# Patient Record
Sex: MALE | Race: WHITE | NOT HISPANIC OR LATINO
[De-identification: names, ages, dates, MRNs, and addresses within clinical notes are randomized per-mention and may not be internally consistent; named-entity substitution may affect disease eponyms.]

---

## 2020-11-09 ENCOUNTER — TRANSCRIPTION ENCOUNTER (OUTPATIENT)
Age: 55
End: 2020-11-09

## 2020-12-17 ENCOUNTER — APPOINTMENT (OUTPATIENT)
Dept: UROLOGY | Facility: CLINIC | Age: 55
End: 2020-12-17

## 2021-01-07 ENCOUNTER — APPOINTMENT (OUTPATIENT)
Dept: UROLOGY | Facility: CLINIC | Age: 56
End: 2021-01-07

## 2021-03-18 ENCOUNTER — APPOINTMENT (OUTPATIENT)
Age: 56
End: 2021-03-18

## 2025-06-08 ENCOUNTER — INPATIENT (INPATIENT)
Facility: HOSPITAL | Age: 60
LOS: 2 days | Discharge: HOME | DRG: 204 | End: 2025-06-11
Attending: STUDENT IN AN ORGANIZED HEALTH CARE EDUCATION/TRAINING PROGRAM | Admitting: STUDENT IN AN ORGANIZED HEALTH CARE EDUCATION/TRAINING PROGRAM
Payer: COMMERCIAL

## 2025-06-08 VITALS
HEART RATE: 88 BPM | DIASTOLIC BLOOD PRESSURE: 88 MMHG | SYSTOLIC BLOOD PRESSURE: 151 MMHG | OXYGEN SATURATION: 97 % | HEIGHT: 69 IN | RESPIRATION RATE: 20 BRPM | TEMPERATURE: 98 F | WEIGHT: 180.56 LBS

## 2025-06-08 LAB
ALBUMIN SERPL ELPH-MCNC: 4.3 G/DL — SIGNIFICANT CHANGE UP (ref 3.3–5.2)
ALP SERPL-CCNC: 94 U/L — SIGNIFICANT CHANGE UP (ref 40–120)
ALT FLD-CCNC: 12 U/L — SIGNIFICANT CHANGE UP
ANION GAP SERPL CALC-SCNC: 18 MMOL/L — HIGH (ref 5–17)
APTT BLD: 28.5 SEC — SIGNIFICANT CHANGE UP (ref 26.1–36.8)
AST SERPL-CCNC: 16 U/L — SIGNIFICANT CHANGE UP
BASOPHILS # BLD AUTO: 0.06 K/UL — SIGNIFICANT CHANGE UP (ref 0–0.2)
BASOPHILS NFR BLD AUTO: 0.5 % — SIGNIFICANT CHANGE UP (ref 0–2)
BILIRUB SERPL-MCNC: 0.7 MG/DL — SIGNIFICANT CHANGE UP (ref 0.4–2)
BUN SERPL-MCNC: 22.1 MG/DL — HIGH (ref 8–20)
CALCIUM SERPL-MCNC: 9.4 MG/DL — SIGNIFICANT CHANGE UP (ref 8.4–10.5)
CHLORIDE SERPL-SCNC: 94 MMOL/L — LOW (ref 96–108)
CK MB CFR SERPL CALC: 3.3 NG/ML — SIGNIFICANT CHANGE UP (ref 0–6.7)
CK SERPL-CCNC: 169 U/L — SIGNIFICANT CHANGE UP (ref 30–200)
CO2 SERPL-SCNC: 22 MMOL/L — SIGNIFICANT CHANGE UP (ref 22–29)
CREAT SERPL-MCNC: 1.14 MG/DL — SIGNIFICANT CHANGE UP (ref 0.5–1.3)
D DIMER BLD IA.RAPID-MCNC: 174 NG/ML DDU — SIGNIFICANT CHANGE UP
EGFR: 74 ML/MIN/1.73M2 — SIGNIFICANT CHANGE UP
EGFR: 74 ML/MIN/1.73M2 — SIGNIFICANT CHANGE UP
EOSINOPHIL # BLD AUTO: 0.12 K/UL — SIGNIFICANT CHANGE UP (ref 0–0.5)
EOSINOPHIL NFR BLD AUTO: 1.1 % — SIGNIFICANT CHANGE UP (ref 0–6)
GLUCOSE SERPL-MCNC: 191 MG/DL — HIGH (ref 70–99)
HCT VFR BLD CALC: 41.6 % — SIGNIFICANT CHANGE UP (ref 39–50)
HGB BLD-MCNC: 14.3 G/DL — SIGNIFICANT CHANGE UP (ref 13–17)
IMM GRANULOCYTES # BLD AUTO: 0.03 K/UL — SIGNIFICANT CHANGE UP (ref 0–0.07)
IMM GRANULOCYTES NFR BLD AUTO: 0.3 % — SIGNIFICANT CHANGE UP (ref 0–0.9)
INR BLD: 0.91 RATIO — SIGNIFICANT CHANGE UP (ref 0.85–1.16)
LYMPHOCYTES # BLD AUTO: 2.18 K/UL — SIGNIFICANT CHANGE UP (ref 1–3.3)
LYMPHOCYTES NFR BLD AUTO: 19.2 % — SIGNIFICANT CHANGE UP (ref 13–44)
MCHC RBC-ENTMCNC: 28.5 PG — SIGNIFICANT CHANGE UP (ref 27–34)
MCHC RBC-ENTMCNC: 34.4 G/DL — SIGNIFICANT CHANGE UP (ref 32–36)
MCV RBC AUTO: 83 FL — SIGNIFICANT CHANGE UP (ref 80–100)
MONOCYTES # BLD AUTO: 0.71 K/UL — SIGNIFICANT CHANGE UP (ref 0–0.9)
MONOCYTES NFR BLD AUTO: 6.2 % — SIGNIFICANT CHANGE UP (ref 2–14)
NEUTROPHILS # BLD AUTO: 8.27 K/UL — HIGH (ref 1.8–7.4)
NEUTROPHILS NFR BLD AUTO: 72.7 % — SIGNIFICANT CHANGE UP (ref 43–77)
NRBC # BLD AUTO: 0 K/UL — SIGNIFICANT CHANGE UP (ref 0–0)
NRBC # FLD: 0 K/UL — SIGNIFICANT CHANGE UP (ref 0–0)
NRBC BLD AUTO-RTO: 0 /100 WBCS — SIGNIFICANT CHANGE UP (ref 0–0)
PLATELET # BLD AUTO: 233 K/UL — SIGNIFICANT CHANGE UP (ref 150–400)
PMV BLD: 9.7 FL — SIGNIFICANT CHANGE UP (ref 7–13)
POTASSIUM SERPL-MCNC: 4.2 MMOL/L — SIGNIFICANT CHANGE UP (ref 3.5–5.3)
POTASSIUM SERPL-SCNC: 4.2 MMOL/L — SIGNIFICANT CHANGE UP (ref 3.5–5.3)
PROT SERPL-MCNC: 7.4 G/DL — SIGNIFICANT CHANGE UP (ref 6.6–8.7)
PROTHROM AB SERPL-ACNC: 10.3 SEC — SIGNIFICANT CHANGE UP (ref 9.9–13.4)
RBC # BLD: 5.01 M/UL — SIGNIFICANT CHANGE UP (ref 4.2–5.8)
RBC # FLD: 12.7 % — SIGNIFICANT CHANGE UP (ref 10.3–14.5)
SODIUM SERPL-SCNC: 134 MMOL/L — LOW (ref 135–145)
TROPONIN T, HIGH SENSITIVITY RESULT: 10 NG/L — SIGNIFICANT CHANGE UP (ref 0–51)
TROPONIN T, HIGH SENSITIVITY RESULT: 12 NG/L — SIGNIFICANT CHANGE UP (ref 0–51)
WBC # BLD: 11.37 K/UL — HIGH (ref 3.8–10.5)
WBC # FLD AUTO: 11.37 K/UL — HIGH (ref 3.8–10.5)

## 2025-06-08 PROCEDURE — 71045 X-RAY EXAM CHEST 1 VIEW: CPT | Mod: 26

## 2025-06-08 PROCEDURE — 99223 1ST HOSP IP/OBS HIGH 75: CPT

## 2025-06-08 RX ORDER — SODIUM CHLORIDE 9 G/1000ML
1000 INJECTION, SOLUTION INTRAVENOUS
Refills: 0 | Status: DISCONTINUED | OUTPATIENT
Start: 2025-06-08 | End: 2025-06-11

## 2025-06-08 RX ORDER — DEXTROSE 50 % IN WATER 50 %
25 SYRINGE (ML) INTRAVENOUS ONCE
Refills: 0 | Status: DISCONTINUED | OUTPATIENT
Start: 2025-06-08 | End: 2025-06-11

## 2025-06-08 RX ORDER — AMLODIPINE BESYLATE 10 MG/1
5 TABLET ORAL DAILY
Refills: 0 | Status: DISCONTINUED | OUTPATIENT
Start: 2025-06-08 | End: 2025-06-11

## 2025-06-08 RX ORDER — INSULIN LISPRO 100 U/ML
INJECTION, SOLUTION INTRAVENOUS; SUBCUTANEOUS
Refills: 0 | Status: DISCONTINUED | OUTPATIENT
Start: 2025-06-08 | End: 2025-06-11

## 2025-06-08 RX ORDER — ROSUVASTATIN CALCIUM 20 MG/1
10 TABLET, FILM COATED ORAL AT BEDTIME
Refills: 0 | Status: DISCONTINUED | OUTPATIENT
Start: 2025-06-08 | End: 2025-06-09

## 2025-06-08 RX ORDER — DEXTROSE 50 % IN WATER 50 %
12.5 SYRINGE (ML) INTRAVENOUS ONCE
Refills: 0 | Status: DISCONTINUED | OUTPATIENT
Start: 2025-06-08 | End: 2025-06-11

## 2025-06-08 RX ORDER — GLUCAGON 3 MG/1
1 POWDER NASAL ONCE
Refills: 0 | Status: DISCONTINUED | OUTPATIENT
Start: 2025-06-08 | End: 2025-06-11

## 2025-06-08 RX ORDER — DEXTROSE 50 % IN WATER 50 %
15 SYRINGE (ML) INTRAVENOUS ONCE
Refills: 0 | Status: DISCONTINUED | OUTPATIENT
Start: 2025-06-08 | End: 2025-06-11

## 2025-06-08 RX ORDER — LOSARTAN POTASSIUM 100 MG/1
100 TABLET, FILM COATED ORAL DAILY
Refills: 0 | Status: DISCONTINUED | OUTPATIENT
Start: 2025-06-08 | End: 2025-06-11

## 2025-06-08 RX ORDER — LINAGLIPTIN 5 MG/1
5 TABLET, FILM COATED ORAL DAILY
Refills: 0 | Status: DISCONTINUED | OUTPATIENT
Start: 2025-06-08 | End: 2025-06-09

## 2025-06-08 RX ORDER — METFORMIN HYDROCHLORIDE 850 MG/1
1000 TABLET ORAL DAILY
Refills: 0 | Status: DISCONTINUED | OUTPATIENT
Start: 2025-06-08 | End: 2025-06-09

## 2025-06-08 RX ADMIN — Medication 1000 MILLILITER(S): at 23:27

## 2025-06-08 NOTE — ED PROVIDER NOTE - CHIEF COMPLAINT
The skin of the right groin and right wrist was clipped, prepped and draped in the usual sterile manner. (If not otherwise specified, skin prep was bilateral.) The patient is a 60y Male complaining of chest pain.

## 2025-06-08 NOTE — ED ADULT TRIAGE NOTE - CHIEF COMPLAINT QUOTE
Patient presents to ED sent by Urgent Care for intermittant chest pain that started this morning. Patient denies dizziness at this time, denies being diaphoretic. Denies N/V

## 2025-06-08 NOTE — ED CDU PROVIDER INITIAL DAY NOTE - ATTENDING APP SHARED VISIT CONTRIBUTION OF CARE
I, Miguel Angel Wilson, performed a face to face bedside interview with this patient regarding history of present illness, and completed an independent physical examination. I personally made/approved the management plan and take responsibility for the patient management. I have communicated the patient’s plan of care and disposition with the ACP.  60 year old placed on obs for chest pain, trop neg, currently sx free, pending cardio consult  Gen: NAD, well appearing  CV: RRR  Pul: CTA b/l  Abd: Soft, non-distended, non-tender  Neuro: no focal deficits

## 2025-06-08 NOTE — ED CDU PROVIDER INITIAL DAY NOTE - CLINICAL SUMMARY MEDICAL DECISION MAKING FREE TEXT BOX
60 year old male with hx htn, hld, dm presented to ED c/o left chest pain. Cardiology consult, tele, trend trops

## 2025-06-08 NOTE — ED CDU PROVIDER INITIAL DAY NOTE - PROGRESS NOTE DETAILS
patient with chest pain, dizziness and diaphoresis while power washing, has risk factors for CAD, never underwent stent placement, seen by cardiology pending coronary CTA and echo.  Will continue to follow.

## 2025-06-08 NOTE — ED PROVIDER NOTE - OBJECTIVE STATEMENT
60 year old male with PMH HTN presents with palpitations and SOB. Pt reports that his Sx started this morning when he was power washing his boat. He states that he broke out into a sweat, his heart was pounding, he had a pressure in his chest and felt very SOB. Sx improved with rest. He reports that throughout the day he has had exertional dyspnea, palpitations, and lightheadedness. The chest discomfort only occurred with the first episode. Sx resolve with rest and he is currently asymptomatic.

## 2025-06-08 NOTE — ED ADULT NURSE NOTE - OBJECTIVE STATEMENT
pt comes in with c/o chest pain, pt states that he has had this pain before but no cardiac history, pt is axox4 no other complaints at this time

## 2025-06-08 NOTE — ED PROVIDER NOTE - CLINICAL SUMMARY MEDICAL DECISION MAKING FREE TEXT BOX
pt currently symptom free, but placed on obs for intermediate exertional dyspnea and chest discomfort

## 2025-06-08 NOTE — ED CDU PROVIDER INITIAL DAY NOTE - OBJECTIVE STATEMENT
60 year old male with hx htn, hld, dm presented to ED c/o left chest pain. Today occurred with exertion while he was power washing, felt racing heart sensation, sob. states the same feeling occurred 1 week ago while laying in bed at rest prompting him to report to PCP where he had an ekg performed. has not followed with a cardiologist. admits to drinking 1 1/2 glasses of wine tonight. denies drug use, non smoker. denies fever/chills, n/v, abd pain, calf pain, leg edema

## 2025-06-09 ENCOUNTER — RESULT REVIEW (OUTPATIENT)
Age: 60
End: 2025-06-09

## 2025-06-09 DIAGNOSIS — I10 ESSENTIAL (PRIMARY) HYPERTENSION: ICD-10-CM

## 2025-06-09 DIAGNOSIS — R06.09 OTHER FORMS OF DYSPNEA: ICD-10-CM

## 2025-06-09 DIAGNOSIS — R07.9 CHEST PAIN, UNSPECIFIED: ICD-10-CM

## 2025-06-09 LAB
ALBUMIN SERPL ELPH-MCNC: 3.9 G/DL — SIGNIFICANT CHANGE UP (ref 3.3–5.2)
ALP SERPL-CCNC: 88 U/L — SIGNIFICANT CHANGE UP (ref 40–120)
ALT FLD-CCNC: 11 U/L — SIGNIFICANT CHANGE UP
ANION GAP SERPL CALC-SCNC: 12 MMOL/L — SIGNIFICANT CHANGE UP (ref 5–17)
AST SERPL-CCNC: 13 U/L — SIGNIFICANT CHANGE UP
BILIRUB SERPL-MCNC: 0.8 MG/DL — SIGNIFICANT CHANGE UP (ref 0.4–2)
BUN SERPL-MCNC: 17.1 MG/DL — SIGNIFICANT CHANGE UP (ref 8–20)
CALCIUM SERPL-MCNC: 8.6 MG/DL — SIGNIFICANT CHANGE UP (ref 8.4–10.5)
CHLORIDE SERPL-SCNC: 101 MMOL/L — SIGNIFICANT CHANGE UP (ref 96–108)
CO2 SERPL-SCNC: 22 MMOL/L — SIGNIFICANT CHANGE UP (ref 22–29)
CREAT SERPL-MCNC: 0.86 MG/DL — SIGNIFICANT CHANGE UP (ref 0.5–1.3)
EGFR: 100 ML/MIN/1.73M2 — SIGNIFICANT CHANGE UP
EGFR: 100 ML/MIN/1.73M2 — SIGNIFICANT CHANGE UP
GLUCOSE BLDC GLUCOMTR-MCNC: 126 MG/DL — HIGH (ref 70–99)
GLUCOSE BLDC GLUCOMTR-MCNC: 139 MG/DL — HIGH (ref 70–99)
GLUCOSE BLDC GLUCOMTR-MCNC: 241 MG/DL — HIGH (ref 70–99)
GLUCOSE SERPL-MCNC: 140 MG/DL — HIGH (ref 70–99)
POTASSIUM SERPL-MCNC: 3.9 MMOL/L — SIGNIFICANT CHANGE UP (ref 3.5–5.3)
POTASSIUM SERPL-SCNC: 3.9 MMOL/L — SIGNIFICANT CHANGE UP (ref 3.5–5.3)
PROT SERPL-MCNC: 6.8 G/DL — SIGNIFICANT CHANGE UP (ref 6.6–8.7)
SODIUM SERPL-SCNC: 135 MMOL/L — SIGNIFICANT CHANGE UP (ref 135–145)

## 2025-06-09 PROCEDURE — 93306 TTE W/DOPPLER COMPLETE: CPT | Mod: 26

## 2025-06-09 PROCEDURE — 99221 1ST HOSP IP/OBS SF/LOW 40: CPT

## 2025-06-09 PROCEDURE — 99223 1ST HOSP IP/OBS HIGH 75: CPT

## 2025-06-09 PROCEDURE — 99233 SBSQ HOSP IP/OBS HIGH 50: CPT

## 2025-06-09 PROCEDURE — 75574 CT ANGIO HRT W/3D IMAGE: CPT | Mod: 26

## 2025-06-09 RX ORDER — METOPROLOL SUCCINATE 50 MG/1
25 TABLET, EXTENDED RELEASE ORAL ONCE
Refills: 0 | Status: COMPLETED | OUTPATIENT
Start: 2025-06-09 | End: 2025-06-09

## 2025-06-09 RX ORDER — DEXTROAMPHETAMINE SACCHARATE, AMPHETAMINE ASPARTATE MONOHYDRATE, DEXTROAMPHETAMINE SULFATE AND AMPHETAMINE SULFATE 2.5; 2.5; 2.5; 2.5 MG/1; MG/1; MG/1; MG/1
15 CAPSULE, EXTENDED RELEASE ORAL DAILY
Refills: 0 | Status: DISCONTINUED | OUTPATIENT
Start: 2025-06-09 | End: 2025-06-11

## 2025-06-09 RX ORDER — ROSUVASTATIN CALCIUM 20 MG/1
20 TABLET, FILM COATED ORAL AT BEDTIME
Refills: 0 | Status: DISCONTINUED | OUTPATIENT
Start: 2025-06-09 | End: 2025-06-11

## 2025-06-09 RX ORDER — ASPIRIN 325 MG
81 TABLET ORAL DAILY
Refills: 0 | Status: DISCONTINUED | OUTPATIENT
Start: 2025-06-09 | End: 2025-06-11

## 2025-06-09 RX ORDER — ASPIRIN 325 MG
324 TABLET ORAL ONCE
Refills: 0 | Status: COMPLETED | OUTPATIENT
Start: 2025-06-09 | End: 2025-06-09

## 2025-06-09 RX ADMIN — LOSARTAN POTASSIUM 100 MILLIGRAM(S): 100 TABLET, FILM COATED ORAL at 06:32

## 2025-06-09 RX ADMIN — AMLODIPINE BESYLATE 5 MILLIGRAM(S): 10 TABLET ORAL at 06:32

## 2025-06-09 RX ADMIN — LINAGLIPTIN 5 MILLIGRAM(S): 5 TABLET, FILM COATED ORAL at 13:49

## 2025-06-09 RX ADMIN — METOPROLOL SUCCINATE 25 MILLIGRAM(S): 50 TABLET, EXTENDED RELEASE ORAL at 06:32

## 2025-06-09 RX ADMIN — ROSUVASTATIN CALCIUM 20 MILLIGRAM(S): 20 TABLET, FILM COATED ORAL at 21:16

## 2025-06-09 RX ADMIN — Medication 324 MILLIGRAM(S): at 01:54

## 2025-06-09 NOTE — H&P ADULT - NSHPLABSRESULTS_GEN_ALL_CORE
14.3   11.37 )-----------( 233      ( 08 Jun 2025 20:21 )             41.6       06-09    135  |  101  |  17.1  ----------------------------<  140[H]  3.9   |  22.0  |  0.86    Ca    8.6      09 Jun 2025 06:00    TPro  6.8  /  Alb  3.9  /  TBili  0.8  /  DBili  x   /  AST  13  /  ALT  11  /  AlkPhos  88  06-09              Urinalysis Basic - ( 09 Jun 2025 06:00 )    Color: x / Appearance: x / SG: x / pH: x  Gluc: 140 mg/dL / Ketone: x  / Bili: x / Urobili: x   Blood: x / Protein: x / Nitrite: x   Leuk Esterase: x / RBC: x / WBC x   Sq Epi: x / Non Sq Epi: x / Bacteria: x        PT/INR - ( 08 Jun 2025 20:21 )   PT: 10.3 sec;   INR: 0.91 ratio         PTT - ( 08 Jun 2025 20:21 )  PTT:28.5 sec    Lactate Trend      CARDIAC MARKERS ( 08 Jun 2025 20:21 )  x     / x     / x     / x     / 3.3 ng/mL        CAPILLARY BLOOD GLUCOSE      POCT Blood Glucose.: 126 mg/dL (09 Jun 2025 13:14)    < from: CT Angio Cardiac w/ IV Cont (06.09.25 @ 12:27) >    Impression:  1.  Obstructive coronary artery disease with mixed   (calcified/non-calcified) plaque resulting in severe stenosis (70-99%) of  the proximal LAD. Mild stenosis (25-49%) of the mid-distal RCA and   proximal LAD.  2.  Total Agatston calcium score is 195 AU.    < end of copied text >

## 2025-06-09 NOTE — ED CDU PROVIDER SUBSEQUENT DAY NOTE - ATTENDING APP SHARED VISIT CONTRIBUTION OF CARE
I agree with the PA's note and was available for any issues/concerns. I was directly involved in patient care. My brief overall assessment is as follows: no events overnight, awaiting ctca/imaging, reassess

## 2025-06-09 NOTE — ED CDU PROVIDER SUBSEQUENT DAY NOTE - CLINICAL SUMMARY MEDICAL DECISION MAKING FREE TEXT BOX
Statement: 60 year old male with hx htn, hld, dm presented to ED c/o left chest pain. trops, 10->12. no ischemic change son EKG evaluated by Cards recommending cardiac CT and TTE

## 2025-06-09 NOTE — ED CDU PROVIDER DISPOSITION NOTE - ATTENDING CONTRIBUTION TO CARE
I agree with the PA's note and was available for any issues/concerns. I was directly involved in patient care. My brief overall assessment is as follows: chest pain, trops flat, seen by cards, ctca with severe lad stenosis, cards to cath tmrw. admitted.

## 2025-06-09 NOTE — CONSULT NOTE ADULT - ASSESSMENT
This is 59 y/o male with hx of HTN, HLD, DM2, former smoker who presents with chest pain and palpitations. Pt states that after power washing his boat he started feeling his heart pounding and became dizzy and sweaty. Symptoms initially resolved with rest but then pt started feeling midsternal chest discomfort that felt like indigestion. Pt states that he's been having SULTANA for 2-3 weeks and saw his PCP who was going to schedule exercise stress test. EKG shows SR with nonspecific T-wave abnormality. Troponin 10->12, D-dimer negative. Pt does not have a cardiologist and denies any prior cardiac issues.  60M HTN, HLD, DM2, former smoker who presented with chest pain and palpitations. Pt states that after power washing his boat he started feeling his "heart pounding and became dizzy and sweaty." Symptoms initially resolved with rest but then pt started feeling midsternal chest discomfort that felt like indigestion.  Pt states that he's been having SULTANA for 2-3 weeks and saw his PCP who was going to schedule exercise stress test.  EKG shows SR with nonspecific T-wave abnormality.  Troponin 10->12, D-dimer negative. Pt does not have a cardiologist and denies any prior cardiac issues.

## 2025-06-09 NOTE — ED CDU PROVIDER SUBSEQUENT DAY NOTE - PROGRESS NOTE DETAILS
US results reviewed: 1. Left ventricular cavity is normal in size. Left ventricular wall thickness is normal. Left ventricular systolic function is normal with an ejection fraction visually estimated at 55 to 60 %. There are no regional wall motion abnormalities seen.   2. Basal septal left ventricular hypertrophy without LVOT obstruction.   3. Normal left ventricular diastolic function.   4. Normal right ventricular cavity size and normal right ventricular systolic function.   5. Left atrium is normal in size.   6. The right atrium is normal in size.   7. Trileaflet aortic valve. Fibrocalcific aortic valve sclerosis without stenosis.   8. Structurally normal mitral valve with normal leaflet excursion.   9. Trace mitral regurgitation.  10. No pericardial effusion seen.  11. Pulmonary artery systolic pressure could not be estimated.    Pending CCTA findings, will continue to monitor

## 2025-06-09 NOTE — H&P ADULT - ASSESSMENT
61yo M PMHx HTN, HLD, DM2, pw CP and palpitation. Noted with stenosis on CCTA, admitted for further care.     #CAD with concern for unstable angina   Trop flat, EKG without acute changes  sp  x1  TTE with preserved EF  CCTA reported noted with severe stenosis of LAD  [facility] pending per cards  cards recs radhika    #HTN  #HLD  cw home statin, losaratn and amlodpine     #DM  ISS, accu checks, CC  fu A1C level in AM    DVT ppx: SCD pendig cardiac workup   Diet: CC  Dispo: pending cardiac wokrup

## 2025-06-09 NOTE — CONSULT NOTE ADULT - NS ATTEND AMEND GEN_ALL_CORE FT
I agree with the assessment and plan as above. Patient was seen and examined by me. I edited the entire note. Cardiac studies as described above.     60M HTN, HLD, DM2, former smoker who presented with chest pain and palpitations. Pt states that after power washing his boat he started feeling his "heart pounding and became dizzy and sweaty." Symptoms initially resolved with rest but then pt started feeling midsternal chest discomfort that felt like indigestion.  Pt states that he's been having SULTANA for 2-3 weeks and saw his PCP who was going to schedule exercise stress test.  EKG shows SR with nonspecific T-wave abnormality.  Troponin 10->12, D-dimer negative. Pt does not have a cardiologist and denies any prior cardiac issues.     Echo today showed left ventricular cavity is normal in size. Left ventricular wall thickness is normal. Left ventricular systolic function is normal with an ejection fraction visually estimated at 55 to 60 %. There are no regional wall motion abnormalities seen. Basal septal left ventricular hypertrophy without LVOT obstruction. Normal left ventricular diastolic function. Normal right ventricular cavity size and normal right ventricular systolic function. Left atrium is normal in size. The right atrium is normal in size. Trileaflet aortic valve. Fibrocalcific aortic valve sclerosis without stenosis. Structurally normal mitral valve with normal leaflet excursion. Trace mitral regurgitation. No pericardial effusion seen. Pulmonary artery systolic pressure could not be estimated.    - Agree with CCTA to assess for obstructive coronary artery disease.   - Despite no events on telemetry, recommend 7-day heart monitor outpatient if there is no evidence of obstructive CAD to assess arrhythmic burden (if present).      The patient agreed with the plan. We will follow up on the CCTA.

## 2025-06-09 NOTE — CONSULT NOTE ADULT - SUBJECTIVE AND OBJECTIVE BOX
[facility]                                              CARDIOLOGY AT [address], [place] [address]                                             Telephone: [phone]. Fax:[phone]                                                       CARDIOLOGY CONSULTATION NOTE                                                                                             History obtained by: Patient and medical record  Community Cardiologist: n/a   obtained: Yes [  ] No [ x ]  Reason for Consultation: chest pain  Available out pt records reviewed: Yes [  ] No [  ]n/a    Chief complaint:    Patient is a 60y old  Male who presents with a chief complaint of chest pain    HPI:  This is 59 y/o male with hx of HTN, HLD, DM2, former smoker who presents with chest pain and palpitations. Pt states that after power washing his boat he started feeling his heart pounding and became dizzy and sweaty. Symptoms initially resolved with rest but then pt started feeling midsternal chest discomfort that felt like indigestion. Pt states that he's been having SULTANA for 2-3 weeks and saw his PCP who was going to schedule exercise stress test. EKG shows SR with nonspecific T-wave abnormality. Troponin 10->12, D-dimer negative. Pt does not have a cardiologist and denies any prior cardiac issues.     CARDIAC TESTING   ECHO: n/a    STRESS: n/a    CATH: n/a    ELECTROPHYSIOLOGY: n/a    PAST MEDICAL HISTORY  HTN  HLD  DM2    PAST SURGICAL HISTORY      SOCIAL HISTORY:  lives with wife, self-employed as   CIGARETTES:   former smoker, quit in 2000  ALCOHOL: tequila on weekends  DRUGS: denies    FAMILY HISTORY:    Family History of Cardiovascular Disease:  Yes [  ] No [  ]  Coronary Artery Disease in first degree relative: Yes [  ] No [  ]  Sudden Cardiac Death in First degree relative: Yes [  ] No [  ]    HOME MEDICATIONS:  Crestor 10 mg  Losartan 100 mg  Amlodipine 5 mg  Metformin  Tradjenta 5 mg  Adderall    CURRENT CARDIAC MEDICATIONS:  amLODIPine   Tablet 5 milliGRAM(s) Oral daily  losartan 100 milliGRAM(s) Oral daily      CURRENT OTHER MEDICATIONS:  aspirin  chewable 324 milliGRAM(s) Oral once, Stop order after: 1 Doses  dextrose 5%. 1000 milliLiter(s) (50 mL/Hr) IV Continuous <Continuous>  dextrose 5%. 1000 milliLiter(s) (100 mL/Hr) IV Continuous <Continuous>  dextrose 50% Injectable 25 Gram(s) IV Push once, Stop order after: 1 Doses  dextrose 50% Injectable 12.5 Gram(s) IV Push once, Stop order after: 1 Doses  dextrose 50% Injectable 25 Gram(s) IV Push once, Stop order after: 1 Doses  dextrose Oral Gel 15 Gram(s) Oral once, Stop order after: 1 Doses PRN Blood Glucose LESS THAN 70 milliGRAM(s)/deciliter  glucagon  Injectable 1 milliGRAM(s) IntraMuscular once, Stop order after: 1 Doses  insulin lispro (ADMELOG) corrective regimen sliding scale   SubCutaneous three times a day before meals  linagliptin 5 milliGRAM(s) Oral daily  metFORMIN 1000 milliGRAM(s) Oral daily  rosuvastatin 10 milliGRAM(s) Oral at bedtime      ALLERGIES:   No Known Allergies      REVIEW OF SYMPTOMS:   CONSTITUTIONAL: No fever, no chills, no weight loss, no weight gain, no fatigue   ENMT:  No vertigo; No sinus or throat pain  NECK: No pain or stiffness  CARDIOVASCULAR: as per HPI  RESPIRATORY: no Shortness of breath, no cough, no wheezing  : No dysuria, no hematuria   GI: No dark color stool, no nausea, no diarrhea, no constipation, no abdominal pain   NEURO: No headache, no slurred speech   MUSCULOSKELETAL: No joint pain or swelling; No muscle, back, or extremity pain  PSYCH: No agitation, no anxiety.    ALL OTHER REVIEW OF SYSTEMS ARE NEGATIVE.    VITAL SIGNS:  T(C): 36.3 (06-08-25 @ 23:13), Max: 36.4 (06-08-25 @ 18:45)  T(F): 97.3 (06-08-25 @ 23:13), Max: 97.6 (06-08-25 @ 18:45)  HR: 88 (06-08-25 @ 23:13) (88 - 88)  BP: 157/82 (06-08-25 @ 23:13) (151/88 - 157/82)  RR: 18 (06-08-25 @ 23:13) (18 - 20)  SpO2: 96% (06-08-25 @ 23:13) (96% - 97%)    INTAKE AND OUTPUT:       PHYSICAL EXAM:  Constitutional: Comfortable . No acute distress.   HEENT: Atraumatic and normocephalic , neck is supple . no JVD. No carotid bruit.  CNS: A&Ox3. No focal deficits.   Respiratory: CTAB, unlabored   Cardiovascular: RRR normal s1 s2. No murmurs  Gastrointestinal: Soft, non-tender. +Bowel sounds.   Extremities: 2+ Peripheral Pulses, No clubbing, cyanosis, or edema  Psychiatric: Calm . no agitation.   Skin: Warm and dry, no ulcers on extremities     LABS:                            14.3   11.37 )-----------( 233      ( 08 Jun 2025 20:21 )             41.6     06-08    134[L]  |  94[L]  |  22.1[H]  ----------------------------<  191[H]  4.2   |  22.0  |  1.14    Ca    9.4      08 Jun 2025 20:21    TPro  7.4  /  Alb  4.3  /  TBili  0.7  /  DBili  x   /  AST  16  /  ALT  12  /  AlkPhos  94  06-08    PT/INR - ( 08 Jun 2025 20:21 )   PT: 10.3 sec;   INR: 0.91 ratio         PTT - ( 08 Jun 2025 20:21 )  PTT:28.5 sec  Urinalysis Basic - ( 08 Jun 2025 20:21 )    Color: x / Appearance: x / SG: x / pH: x  Gluc: 191 mg/dL / Ketone: x  / Bili: x / Urobili: x   Blood: x / Protein: x / Nitrite: x   Leuk Esterase: x / RBC: x / WBC x   Sq Epi: x / Non Sq Epi: x / Bacteria: x        INTERPRETATION OF TELEMETRY: SR 80's    ECG: SR 83 bpm, nonspecific T-wave abnormality  Prior ECG: Yes [  ] No [ x ]    RADIOLOGY & ADDITIONAL STUDIES: n/a   X-ray:    CT scan:   MRI:   US:                                                [facility]                                              CARDIOLOGY AT [address], [place] [address]                                             Telephone: [phone]. Fax:[phone]                                                       CARDIOLOGY CONSULTATION NOTE                                                                                             History obtained by: Patient and medical record  Community Cardiologist: n/a   obtained: Yes [  ] No [ x ]  Reason for Consultation: chest pain  Available out pt records reviewed: Yes [  ] No [  ]n/a    Chief complaint:    Patient is a 60y old  Male who presents with a chief complaint of chest pain    HPI:  60M HTN, HLD, DM2, former smoker who presented with chest pain and palpitations. Pt states that after power washing his boat he started feeling his "heart pounding and became dizzy and sweaty." Symptoms initially resolved with rest but then pt started feeling midsternal chest discomfort that felt like indigestion.  Pt states that he's been having SULTANA for 2-3 weeks and saw his PCP who was going to schedule exercise stress test.  EKG shows SR with nonspecific T-wave abnormality.  Troponin 10->12, D-dimer negative. Pt does not have a cardiologist and denies any prior cardiac issues.     CARDIAC TESTING   ECHO: n/a    STRESS: n/a    CATH: n/a    ELECTROPHYSIOLOGY: n/a    PAST MEDICAL HISTORY  HTN  HLD  DM2    PAST SURGICAL HISTORY      SOCIAL HISTORY:  lives with wife, self-employed as   CIGARETTES:   former smoker, quit in 2000  ALCOHOL: tequila on weekends  DRUGS: denies    FAMILY HISTORY:    Family History of Cardiovascular Disease:  Yes [  ] No [  ]  Coronary Artery Disease in first degree relative: Yes [  ] No [  ]  Sudden Cardiac Death in First degree relative: Yes [  ] No [  ]    HOME MEDICATIONS:  Crestor 10 mg  Losartan 100 mg  Amlodipine 5 mg  Metformin  Tradjenta 5 mg  Adderall    CURRENT CARDIAC MEDICATIONS:  amLODIPine   Tablet 5 milliGRAM(s) Oral daily  losartan 100 milliGRAM(s) Oral daily      CURRENT OTHER MEDICATIONS:  aspirin  chewable 324 milliGRAM(s) Oral once, Stop order after: 1 Doses  dextrose 5%. 1000 milliLiter(s) (50 mL/Hr) IV Continuous <Continuous>  dextrose 5%. 1000 milliLiter(s) (100 mL/Hr) IV Continuous <Continuous>  dextrose 50% Injectable 25 Gram(s) IV Push once, Stop order after: 1 Doses  dextrose 50% Injectable 12.5 Gram(s) IV Push once, Stop order after: 1 Doses  dextrose 50% Injectable 25 Gram(s) IV Push once, Stop order after: 1 Doses  dextrose Oral Gel 15 Gram(s) Oral once, Stop order after: 1 Doses PRN Blood Glucose LESS THAN 70 milliGRAM(s)/deciliter  glucagon  Injectable 1 milliGRAM(s) IntraMuscular once, Stop order after: 1 Doses  insulin lispro (ADMELOG) corrective regimen sliding scale   SubCutaneous three times a day before meals  linagliptin 5 milliGRAM(s) Oral daily  metFORMIN 1000 milliGRAM(s) Oral daily  rosuvastatin 10 milliGRAM(s) Oral at bedtime      ALLERGIES:   No Known Allergies      REVIEW OF SYMPTOMS:   CONSTITUTIONAL: No fever, no chills, no weight loss, no weight gain, no fatigue   ENMT:  No vertigo; No sinus or throat pain  NECK: No pain or stiffness  CARDIOVASCULAR: as per HPI  RESPIRATORY: no Shortness of breath, no cough, no wheezing  : No dysuria, no hematuria   GI: No dark color stool, no nausea, no diarrhea, no constipation, no abdominal pain   NEURO: No headache, no slurred speech   MUSCULOSKELETAL: No joint pain or swelling; No muscle, back, or extremity pain  PSYCH: No agitation, no anxiety.    ALL OTHER REVIEW OF SYSTEMS ARE NEGATIVE.    VITAL SIGNS:  T(C): 36.3 (06-08-25 @ 23:13), Max: 36.4 (06-08-25 @ 18:45)  T(F): 97.3 (06-08-25 @ 23:13), Max: 97.6 (06-08-25 @ 18:45)  HR: 88 (06-08-25 @ 23:13) (88 - 88)  BP: 157/82 (06-08-25 @ 23:13) (151/88 - 157/82)  RR: 18 (06-08-25 @ 23:13) (18 - 20)  SpO2: 96% (06-08-25 @ 23:13) (96% - 97%)    INTAKE AND OUTPUT:       PHYSICAL EXAM:  Constitutional: Comfortable . No acute distress.   HEENT: Atraumatic and normocephalic , neck is supple . no JVD. No carotid bruit.  CNS: A&Ox3. No focal deficits.   Respiratory: CTAB, unlabored   Cardiovascular: RRR normal s1 s2. No murmurs  Gastrointestinal: Soft, non-tender. +Bowel sounds.   Extremities: 2+ Peripheral Pulses, No clubbing, cyanosis, or edema  Psychiatric: Calm . no agitation. +Anxious  Skin: Warm and dry, no ulcers on extremities     LABS:                            14.3   11.37 )-----------( 233      ( 08 Jun 2025 20:21 )             41.6     06-08    134[L]  |  94[L]  |  22.1[H]  ----------------------------<  191[H]  4.2   |  22.0  |  1.14    Ca    9.4      08 Jun 2025 20:21    TPro  7.4  /  Alb  4.3  /  TBili  0.7  /  DBili  x   /  AST  16  /  ALT  12  /  AlkPhos  94  06-08    PT/INR - ( 08 Jun 2025 20:21 )   PT: 10.3 sec;   INR: 0.91 ratio         PTT - ( 08 Jun 2025 20:21 )  PTT:28.5 sec  Urinalysis Basic - ( 08 Jun 2025 20:21 )    Color: x / Appearance: x / SG: x / pH: x  Gluc: 191 mg/dL / Ketone: x  / Bili: x / Urobili: x   Blood: x / Protein: x / Nitrite: x   Leuk Esterase: x / RBC: x / WBC x   Sq Epi: x / Non Sq Epi: x / Bacteria: x        INTERPRETATION OF TELEMETRY: SR 80's    ECG: SR 83 bpm, nonspecific T-wave abnormality  Prior ECG: Yes [  ] No [ x ]    RADIOLOGY & ADDITIONAL STUDIES: n/a   X-ray:    CT scan:   MRI:   US:

## 2025-06-09 NOTE — H&P ADULT - NSHPREVIEWOFSYSTEMS_GEN_ALL_CORE
REVIEW OF SYSTEMS:    CONSTITUTIONAL: No weakness, fevers or chills  EYES: No vertigo or throat pain  ENT: No visual changes, eye pain  MOUTH: moist sheila mucosal, no mouth ulcers  NECK: No pain or stiffness  RESPIRATORY: No cough, wheezing, hemoptysis; No shortness of breath  CARDIOVASCULAR: + chest pain or palpitations  GASTROINTESTINAL: No abdominal or epigastric pain. No nausea, vomiting, or hematemesis; No diarrhea or constipation. No melena or hematochezia.  GENITOURINARY: No dysuria, frequency or hematuria  NEUROLOGICAL: No numbness or weakness  SKIN: No itching, rashes  PSYCH: No anxiety or depression

## 2025-06-09 NOTE — H&P ADULT - HISTORY OF PRESENT ILLNESS
61yo M PMHx HTN, HLD, DM2, pw CP and palpitation. Reports that he was power washing his boat the other days and developed some palpitaions as well as diaphoresis/dizziness. initially thought the symptoms were due to the heat. Developed persistent midsternal CP later that day whenevery he tried to walk around the house so he came to the ED for further eval. ROS negative for fever, chill, n/v/c/d nor urinary concerns. Pt was a former smoker.   ED vitals and labs negative. s/p TTE with preserved EF. Initially in obs, subsequently underwent CCTA which noted severe stenosis of LAD, and now requested by cards for admission for cath.

## 2025-06-09 NOTE — ED CDU PROVIDER DISPOSITION NOTE - CLINICAL COURSE
Patient with HTN, HLDA, DM, here for chest pain, had echo and CCTA performed showing severe LAD stenosis.  Plan for cath tomorrow.

## 2025-06-09 NOTE — CONSULT NOTE ADULT - PROBLEM SELECTOR RECOMMENDATION 9
Likely atypical MSK pain  Troponin's negative, ECHO on 1/15 benign  Not related to exertion  Follow up with PCP as an outpatient  No further inpatient intervention   -telemetry overnight  - mg x1  -EKG with chest pain  -multiple risk factors for CAD  -TTE and CCTA in am

## 2025-06-09 NOTE — CONSULT NOTE ADULT - TIME BILLING
History, vitals, exam, meds, labs, cardiac images (echo), ECG, and tele were reviewed.  Pt and pat family's questions were answered at the bedside.

## 2025-06-09 NOTE — H&P ADULT - NSHPPHYSICALEXAM_GEN_ALL_CORE
VITALS:   T(C): 36.5 (06-09-25 @ 07:26), Max: 36.5 (06-09-25 @ 03:46)  HR: 68 (06-09-25 @ 15:23) (62 - 88)  BP: 126/77 (06-09-25 @ 12:46) (126/77 - 169/80)  RR: 18 (06-09-25 @ 15:23) (14 - 20)  SpO2: 100% (06-09-25 @ 15:23) (96% - 100%)    GENERAL: NAD, lying in bed comfortably  HEAD:  Atraumatic, Normocephalic  CHEST/LUNG: Clear to auscultation bilaterally; No rales, rhonchi, wheezing, or rubs. Unlabored respirations  HEART: Regular rate and rhythm; No murmurs, rubs, or gallops  ABDOMEN: BSx4; Soft, nontender, nondistended  EXTREMITIES:  2+ Peripheral Pulses, brisk capillary refill. No clubbing, cyanosis, or edema  NERVOUS SYSTEM:  A&Ox3, no focal deficits   SKIN: No rashes or lesions  PSYCH: Normal affect, euthymic mood

## 2025-06-10 ENCOUNTER — TRANSCRIPTION ENCOUNTER (OUTPATIENT)
Age: 60
End: 2025-06-10

## 2025-06-10 LAB
A1C WITH ESTIMATED AVERAGE GLUCOSE RESULT: 7 % — HIGH (ref 4–5.6)
ANION GAP SERPL CALC-SCNC: 13 MMOL/L — SIGNIFICANT CHANGE UP (ref 5–17)
BUN SERPL-MCNC: 15.1 MG/DL — SIGNIFICANT CHANGE UP (ref 8–20)
CALCIUM SERPL-MCNC: 8.9 MG/DL — SIGNIFICANT CHANGE UP (ref 8.4–10.5)
CHLORIDE SERPL-SCNC: 105 MMOL/L — SIGNIFICANT CHANGE UP (ref 96–108)
CO2 SERPL-SCNC: 23 MMOL/L — SIGNIFICANT CHANGE UP (ref 22–29)
CREAT SERPL-MCNC: 0.89 MG/DL — SIGNIFICANT CHANGE UP (ref 0.5–1.3)
EGFR: 99 ML/MIN/1.73M2 — SIGNIFICANT CHANGE UP
EGFR: 99 ML/MIN/1.73M2 — SIGNIFICANT CHANGE UP
ESTIMATED AVERAGE GLUCOSE: 154 MG/DL — HIGH (ref 68–114)
GLUCOSE BLDC GLUCOMTR-MCNC: 134 MG/DL — HIGH (ref 70–99)
GLUCOSE BLDC GLUCOMTR-MCNC: 180 MG/DL — HIGH (ref 70–99)
GLUCOSE BLDC GLUCOMTR-MCNC: 182 MG/DL — HIGH (ref 70–99)
GLUCOSE BLDC GLUCOMTR-MCNC: 186 MG/DL — HIGH (ref 70–99)
GLUCOSE BLDC GLUCOMTR-MCNC: 234 MG/DL — HIGH (ref 70–99)
GLUCOSE BLDC GLUCOMTR-MCNC: 242 MG/DL — HIGH (ref 70–99)
GLUCOSE SERPL-MCNC: 209 MG/DL — HIGH (ref 70–99)
HCT VFR BLD CALC: 42.9 % — SIGNIFICANT CHANGE UP (ref 39–50)
HGB BLD-MCNC: 14.8 G/DL — SIGNIFICANT CHANGE UP (ref 13–17)
MCHC RBC-ENTMCNC: 28.8 PG — SIGNIFICANT CHANGE UP (ref 27–34)
MCHC RBC-ENTMCNC: 34.5 G/DL — SIGNIFICANT CHANGE UP (ref 32–36)
MCV RBC AUTO: 83.5 FL — SIGNIFICANT CHANGE UP (ref 80–100)
NRBC # BLD AUTO: 0 K/UL — SIGNIFICANT CHANGE UP (ref 0–0)
NRBC # FLD: 0 K/UL — SIGNIFICANT CHANGE UP (ref 0–0)
NRBC BLD AUTO-RTO: 0 /100 WBCS — SIGNIFICANT CHANGE UP (ref 0–0)
PLATELET # BLD AUTO: 251 K/UL — SIGNIFICANT CHANGE UP (ref 150–400)
PMV BLD: 10.3 FL — SIGNIFICANT CHANGE UP (ref 7–13)
POTASSIUM SERPL-MCNC: 4.6 MMOL/L — SIGNIFICANT CHANGE UP (ref 3.5–5.3)
POTASSIUM SERPL-SCNC: 4.6 MMOL/L — SIGNIFICANT CHANGE UP (ref 3.5–5.3)
RBC # BLD: 5.14 M/UL — SIGNIFICANT CHANGE UP (ref 4.2–5.8)
RBC # FLD: 12.6 % — SIGNIFICANT CHANGE UP (ref 10.3–14.5)
SODIUM SERPL-SCNC: 141 MMOL/L — SIGNIFICANT CHANGE UP (ref 135–145)
WBC # BLD: 8.18 K/UL — SIGNIFICANT CHANGE UP (ref 3.8–10.5)
WBC # FLD AUTO: 8.18 K/UL — SIGNIFICANT CHANGE UP (ref 3.8–10.5)

## 2025-06-10 PROCEDURE — 92978 ENDOLUMINL IVUS OCT C 1ST: CPT | Mod: 26,LD

## 2025-06-10 PROCEDURE — 99152 MOD SED SAME PHYS/QHP 5/>YRS: CPT

## 2025-06-10 PROCEDURE — 99232 SBSQ HOSP IP/OBS MODERATE 35: CPT

## 2025-06-10 PROCEDURE — 99233 SBSQ HOSP IP/OBS HIGH 50: CPT

## 2025-06-10 PROCEDURE — 93010 ELECTROCARDIOGRAM REPORT: CPT

## 2025-06-10 PROCEDURE — 93458 L HRT ARTERY/VENTRICLE ANGIO: CPT | Mod: 26,59

## 2025-06-10 PROCEDURE — 92928 PRQ TCAT PLMT NTRAC ST 1 LES: CPT | Mod: LD

## 2025-06-10 RX ORDER — METOPROLOL SUCCINATE 50 MG/1
25 TABLET, EXTENDED RELEASE ORAL DAILY
Refills: 0 | Status: DISCONTINUED | OUTPATIENT
Start: 2025-06-10 | End: 2025-06-11

## 2025-06-10 RX ORDER — CLOPIDOGREL BISULFATE 75 MG/1
75 TABLET, FILM COATED ORAL DAILY
Refills: 0 | Status: DISCONTINUED | OUTPATIENT
Start: 2025-06-10 | End: 2025-06-11

## 2025-06-10 RX ADMIN — LOSARTAN POTASSIUM 100 MILLIGRAM(S): 100 TABLET, FILM COATED ORAL at 05:14

## 2025-06-10 RX ADMIN — INSULIN LISPRO 2: 100 INJECTION, SOLUTION INTRAVENOUS; SUBCUTANEOUS at 09:16

## 2025-06-10 RX ADMIN — INSULIN LISPRO 2: 100 INJECTION, SOLUTION INTRAVENOUS; SUBCUTANEOUS at 17:10

## 2025-06-10 RX ADMIN — AMLODIPINE BESYLATE 5 MILLIGRAM(S): 10 TABLET ORAL at 05:14

## 2025-06-10 RX ADMIN — Medication 250 MILLILITER(S): at 09:17

## 2025-06-10 RX ADMIN — Medication 250 MILLILITER(S): at 14:55

## 2025-06-10 RX ADMIN — ROSUVASTATIN CALCIUM 20 MILLIGRAM(S): 20 TABLET, FILM COATED ORAL at 21:14

## 2025-06-10 RX ADMIN — Medication 81 MILLIGRAM(S): at 05:14

## 2025-06-10 NOTE — DISCHARGE NOTE PROVIDER - CARE PROVIDER_API CALL
Emeka Mantilla  NP in Primary Care Adult-Gerontology  [address] [address]  Phone: [phone]  Fax: [phone]  Scheduled Appointment: 06/13/2025 01:00 PM   Emeka Mantilla  NP in Primary Care Adult-Gerontology  39 [facility], [address] [address]  Phone: [phone]  Fax: [phone]  Scheduled Appointment: 06/13/2025 01:00 PM    Salvador Vaughn  Cardiovascular Disease  39 [facility], [address] [address]  Phone: [phone]  Fax: [phone]  Follow Up Time: 1 month

## 2025-06-10 NOTE — DISCHARGE NOTE PROVIDER - ATTENDING DISCHARGE PHYSICAL EXAMINATION:
VITALS:   T(C): 36.6 (06-11-25 @ 08:56), Max: 36.7 (06-10-25 @ 16:18)  HR: 71 (06-11-25 @ 08:56) (71 - 84)  BP: 165/90 (06-11-25 @ 08:56) (147/71 - 175/84)  RR: 18 (06-11-25 @ 08:56) (16 - 18)  SpO2: 97% (06-11-25 @ 08:56) (97% - 98%)    GENERAL: NAD, lying in bed comfortably  HEAD:  Atraumatic, Normocephalic  EYES: EOMI, PERRLA, conjunctiva and sclera clear  ENT: Moist mucous membranes  NECK: Supple, No JVD  CHEST/LUNG: Clear to auscultation bilaterally; No rales, rhonchi, wheezing, or rubs. Unlabored respirations  HEART: Regular rate and rhythm; No murmurs, rubs, or gallops  ABDOMEN: BSx4; Soft, nontender, nondistended  EXTREMITIES:  2+ Peripheral Pulses, brisk capillary refill. No clubbing, cyanosis, or edema  NERVOUS SYSTEM:  A&Ox3, no focal deficits   SKIN: No rashes or lesions  PSYCH: Normal affect, euthymic mood

## 2025-06-10 NOTE — PROGRESS NOTE ADULT - SUBJECTIVE AND OBJECTIVE BOX
Rosio Mccrary M.D.    Patient is a 60y old  Male who presents with a chief complaint of CAD (10 Og 2025 11:12)      SUBJECTIVE / OVERNIGHT EVENTS: no event overnight.     Patient denies chest pain, SOB, abd pain, N/V, fever, chills, dysuria or any other complaints. All remainder ROS negative.     MEDICATIONS  (STANDING):  amLODIPine   Tablet 5 milliGRAM(s) Oral daily  aspirin  chewable 81 milliGRAM(s) Oral daily  clopidogrel Tablet 75 milliGRAM(s) Oral daily  dextrose 5%. 1000 milliLiter(s) (100 mL/Hr) IV Continuous <Continuous>  dextrose 5%. 1000 milliLiter(s) (50 mL/Hr) IV Continuous <Continuous>  dextrose 50% Injectable 25 Gram(s) IV Push once  dextrose 50% Injectable 12.5 Gram(s) IV Push once  dextrose 50% Injectable 25 Gram(s) IV Push once  glucagon  Injectable 1 milliGRAM(s) IntraMuscular once  insulin lispro (ADMELOG) corrective regimen sliding scale   SubCutaneous three times a day before meals  losartan 100 milliGRAM(s) Oral daily  metoprolol succinate ER 25 milliGRAM(s) Oral daily  rosuvastatin 20 milliGRAM(s) Oral at bedtime  sodium chloride 0.9% Bolus 250 milliLiter(s) IV Bolus once    MEDICATIONS  (PRN):  amphetamine/dextroamphetamine XR 15 milliGRAM(s) Oral daily PRN anxiety  dextrose Oral Gel 15 Gram(s) Oral once PRN Blood Glucose LESS THAN 70 milliGRAM(s)/deciliter      I&O's Summary      PHYSICAL EXAM:  Vital Signs Last 24 Hrs  T(C): 36.9 (10 Og 2025 06:33), Max: 36.9 (10 Og 2025 06:33)  T(F): 98.4 (10 Og 2025 06:33), Max: 98.4 (10 Og 2025 06:33)  HR: 65 (10 Og 2025 11:05) (64 - 89)  BP: 141/75 (10 Og 2025 11:05) (107/91 - 157/86)  BP(mean): 109 (10 Og 2025 04:48) (100 - 109)  RR: 17 (10 Og 2025 11:05) (16 - 18)  SpO2: 97% (10 Og 2025 11:05) (95% - 100%)    Parameters below as of 10 Og 2025 11:05  Patient On (Oxygen Delivery Method): room air    CONSTITUTIONAL: NAD, well-groomed  RESPIRATORY: Normal respiratory effort; lungs are clear to auscultation bilaterally  CARDIOVASCULAR: Regular rate and rhythm, no LE edema  ABDOMEN: Nontender to palpation, normoactive bowel sounds    LABS:                        14.8   8.18  )-----------( 251      ( 10 Og 2025 03:11 )             42.9     06-10    141  |  105  |  15.1  ----------------------------<  209[H]  4.6   |  23.0  |  0.89    Ca    8.9      10 Og 2025 03:11    TPro  6.8  /  Alb  3.9  /  TBili  0.8  /  DBili  x   /  AST  13  /  ALT  11  /  AlkPhos  88  06-09    PT/INR - ( 08 Jun 2025 20:21 )   PT: 10.3 sec;   INR: 0.91 ratio         PTT - ( 08 Jun 2025 20:21 )  PTT:28.5 sec  CARDIAC MARKERS ( 08 Jun 2025 20:21 )  x     / x     / x     / x     / 3.3 ng/mL      Urinalysis Basic - ( 10 Og 2025 03:11 )    Color: x / Appearance: x / SG: x / pH: x  Gluc: 209 mg/dL / Ketone: x  / Bili: x / Urobili: x   Blood: x / Protein: x / Nitrite: x   Leuk Esterase: x / RBC: x / WBC x   Sq Epi: x / Non Sq Epi: x / Bacteria: x        CAPILLARY BLOOD GLUCOSE      POCT Blood Glucose.: 186 mg/dL (10 Og 2025 09:14)  POCT Blood Glucose.: 242 mg/dL (10 Og 2025 07:07)  POCT Blood Glucose.: 234 mg/dL (10 Og 2025 05:20)  POCT Blood Glucose.: 241 mg/dL (09 Jun 2025 22:28)  POCT Blood Glucose.: 126 mg/dL (09 Jun 2025 13:14)      RADIOLOGY & ADDITIONAL TESTS:  Results Reviewed:   Imaging Personally Reviewed:  Electrocardiogram Personally Reviewed:

## 2025-06-10 NOTE — DISCHARGE NOTE PROVIDER - NSDCCPCAREPLAN_GEN_ALL_CORE_FT
PRINCIPAL DISCHARGE DIAGNOSIS  Diagnosis: CAD (coronary artery disease)  Assessment and Plan of Treatment: You had a cardiac cath which revealed 80% blockage of the proximal Left anterior descending artery which was treated with one drug   -Please continue ARB: irbesartan 300mg daily   - continue norvasc 5mg   -HOLD Janumet x 48 hours   - cardiac rehab info provided/referral and communication to cardiac rehab completed   -follow up with cardiologist will see JOSSY Hendrickson on 6/13/2025 @ 1pm      SECONDARY DISCHARGE DIAGNOSES  Diagnosis: Diabetes  Assessment and Plan of Treatment: please HOLD Janumet x 48 hours, may resume on 6/13/2025

## 2025-06-10 NOTE — PROGRESS NOTE ADULT - PROBLEM SELECTOR PLAN 1
Trops negative  + CTA  EF 55% with no regional wall motion abnormalities   No further chest pain   Risk factors for CAD - Htn, DM  Check lipid panel  HGB Aic  7  For cardiac cath today Trops negative  + CTA  EF 55% with no regional wall motion abnormalities   No further chest pain   Risk factors for CAD - Htn, DM  Check lipid panel  HGB Aic  7  For cardiac cath today  c/w Asa, Lipitor, losartan

## 2025-06-10 NOTE — CHART NOTE - NSCHARTNOTEFT_GEN_A_CORE
Now s/p LHC via RRA with Dr. Haynes , tolerated procedure well. Pt arrived to recovery in NAD and HDS, access site stable, no bleed/hematoma, distal pulse +,   Intraprocedurally: pt with 80% prox LAD lesion treated with 4.0x22 MARY ANNE mary jo frontier, mid RCA 30%    Medications:   Fentanyl: 100mcg  Versed: 2mg  Heparin: 11,000  Omnipaque: 67ml  Closure device: vasc band   Post Cath EKbpm no ischemic changes     -Pt is already in-patient  -post cardiac cath orders  -radial precautions  -bedrest x hours post procedure  -EKG post cath  -labs and EKG in am  -NS 0.9% 250ml/hr x 1 bolus: post procedure CHRISTIAN ppx   -continue current medical therapy  -Dual anti platelet therapy with aspirin/ plavix, reinforced importance of strict adherence to DAPT   -statin therapy crestor increased to 20mg  -continue ARB Home dose at discharge  -continue norvasc  -Hold metformin x 48 hours post cath   -[facility] following during hospitalization  -Lifestyle modifications discussed to reduce cardiovascular risk factors including weight reduction, smoking cessation, medication compliance, and routine follow up with Cardiologist to track your BMI, cholesterol, and glucose levels.   - cardiac rehab info provided/referral and communication to cardiac rehab completed   - PLEASE DO NOT ADMINISTER BLOOD TRANSFUSION ON THIS PATIENT WITHIN 72 HOURS OF CARDIAC CATHERIZATION (UNLESS PATIENT IS HEMODYNAMICALLY UNSTABLE OR ACTIVELY BLEEDING) WITHOUT FIRST DISCUSSING WITH INTERVENTIONALIST DR. HAYNES ON TEAMS OR CALLING CATH LAB HOLDING -[phone].    -Management per Hospitalist   -Discharge in am if overnight tele, EKG, labs in am all remain WNL Now s/p LHC via RRA with Dr. Haynes , tolerated procedure well. Pt arrived to recovery in NAD and HDS, access site stable, no bleed/hematoma, distal pulse +,   Intraprocedurally: pt with 80% prox LAD lesion treated with 4.0x22 MARY ANNE mary jo frontier, mid RCA 30%    Medications: oral loading dose Plavix 600mg   Fentanyl: 100mcg  Versed: 2mg  Heparin: 11,000  Omnipaque: 67ml  Closure device: vasc band   Post Cath EKbpm no ischemic changes     -Pt is already in-patient  -post cardiac cath orders  -radial precautions  -bedrest x hours post procedure  -EKG post cath  -labs and EKG in am  -NS 0.9% 250ml/hr x 1 bolus: post procedure CHRISTIAN ppx   -continue current medical therapy  -Dual anti platelet therapy with aspirin/ plavix, reinforced importance of strict adherence to DAPT   -statin therapy crestor increased to 20mg  -continue ARB Home dose at discharge  -continue norvasc  -Hold metformin x 48 hours post cath   -[facility] following during hospitalization  -Lifestyle modifications discussed to reduce cardiovascular risk factors including weight reduction, smoking cessation, medication compliance, and routine follow up with Cardiologist to track your BMI, cholesterol, and glucose levels.   - cardiac rehab info provided/referral and communication to cardiac rehab completed   - PLEASE DO NOT ADMINISTER BLOOD TRANSFUSION ON THIS PATIENT WITHIN 72 HOURS OF CARDIAC CATHERIZATION (UNLESS PATIENT IS HEMODYNAMICALLY UNSTABLE OR ACTIVELY BLEEDING) WITHOUT FIRST DISCUSSING WITH INTERVENTIONALIST DR. HAYNES ON TEAMS OR CALLING CATH LAB HOLDING -[phone].    -Management per Hospitalist   -Discharge in am if overnight tele, EKG, labs in am all remain WNL

## 2025-06-10 NOTE — DISCHARGE NOTE PROVIDER - HOSPITAL COURSE
Brief Hospital Course: 59yo M PMHx HTN, HLD, DM2, pw CP and palpitation. Reports that he was power washing his boat the other days and developed some palpitaions as well as diaphoresis/dizziness. initially thought the symptoms were due to the heat. Developed persistent midsternal CP later that day whenevery he tried to walk around the house so he came to the ED for further eval. ROS negative for fever, chill, n/v/c/d nor urinary concerns. Pt was a former smoker.   ED vitals and labs negative. s/p TTE with preserved EF. Initially in obs, subsequently underwent CCTA which noted severe stenosis of LAD  Now s/p LHC via RRA with Dr. Haynes , tolerated procedure well. Pt arrived to recovery in NAD and HDS, access site stable, no bleed/hematoma, distal pulse +,   Intraprocedurally: pt with 80% prox LAD lesion treated with 4.0x22 MARY ANNE mary jo frontier, mid RCA 30%    Medications:   Fentanyl: 100mcg  Versed: 2mg  Heparin: 11,000  Omnipaque: 67ml  Closure device: vasc band   Post Cath EKbpm no ischemic changes       Plan:   -OP follow up with JOSSY Hendrickson [facility] 2025 @ 1pm  -continue current medical therapy  -Dual anti platelet therapy with aspirin/ plavix, reinforced importance of strict adherence to DAPT   -statin therapy crestor increased to 20mg  -continue ARB Home dose at discharge  -continue norvasc  -Hold metformin x 48 hours post cath   -Lifestyle modifications discussed to reduce cardiovascular risk factors including weight reduction, smoking cessation, medication compliance, and routine follow up with Cardiologist to track your BMI, cholesterol, and glucose levels.   - cardiac rehab info provided/referral and communication to cardiac rehab completed         At the time of discharge patient was hemodynamically stable and amenable to all terms of discharge. The patient has received verbal instructions from myself regarding discharge plans.     Length of Discharge: 45MIN    Patient is medically stable and cleared for discharge to home with outpatient follow up.

## 2025-06-10 NOTE — DISCHARGE NOTE PROVIDER - NSDCFUADDAPPT_GEN_ALL_CORE_FT
APPTS ARE READY TO BE MADE: [X] YES    Best Family or Patient Contact (if needed):    Additional Information about above appointments (if needed):    1: Cardiology   2:   3:     Other comments or requests:    APPTS ARE READY TO BE MADE: [X] YES    Best Family or Patient Contact (if needed):    Additional Information about above appointments (if needed):    1: Cardiology   2:   3:     Other comments or requests:       Prior to outreaching the patient, it was visible that the patient has secured a follow up appointment which was not scheduled by our team. NADEEN BlevinsoracionEmeka on 6/13 at 1pm

## 2025-06-10 NOTE — DISCHARGE NOTE PROVIDER - NSDCMRMEDTOKEN_GEN_ALL_CORE_FT
amLODIPine 5 mg oral tablet: 1 tab(s) orally once a day  Amphetamine-Dextroamphetamine: 15 milligram(s) orally once a day as needed for  anxiety  irbesartan 300 mg oral tablet: 1 tab(s) orally once a day  Janumet 50 mg-1000 mg oral tablet: 1 tab(s) orally 2 times a day  rosuvastatin 10 mg oral tablet: 1 tab(s) orally once a day  tadalafil 10 mg oral tablet: 1 tab(s) orally once a day   amLODIPine 5 mg oral tablet: 1 tab(s) orally once a day  Amphetamine-Dextroamphetamine: 15 milligram(s) orally once a day as needed for  anxiety  aspirin 81 mg oral tablet, chewable: 1 tab(s) orally once a day  clopidogrel 75 mg oral tablet: 1 tab(s) orally once a day  Crestor 20 mg oral tablet: 1 tab(s) orally once a day (at bedtime)  irbesartan 300 mg oral tablet: 1 tab(s) orally once a day  Janumet 50 mg-1000 mg oral tablet: 1 tab(s) orally 2 times a day  metoprolol succinate 25 mg oral tablet, extended release: 1 tab(s) orally once a day  tadalafil 10 mg oral tablet: 1 tab(s) orally once a day

## 2025-06-10 NOTE — CONSULT NOTE ADULT - ASSESSMENT
Risk Assessments:  ASA: 3  Mallampati: 2  BRA: 0.8  creat: 0.89  GFR: 99    Indication: obstructive CAD 70-99% prox LAD     Coronary Anatomy:     Plan:   -[facility]   -Preferred access: RRA   -EKG and labs reviewed  -Aspirin 81mg po pre procedure  -250 0.9% NS bolus pre procedure: CHRISTIAN ppx   -risks and benefits discussed with patient, consent obtained

## 2025-06-10 NOTE — DISCHARGE NOTE PROVIDER - CARE PROVIDERS DIRECT ADDRESSES
[email or web address]

## 2025-06-10 NOTE — CONSULT NOTE ADULT - SUBJECTIVE AND OBJECTIVE BOX
[facility]                                              INTERVENTIONAL CARDIOLOGY AT [place] [address]                                             Telephone: [phone]. Fax:[phone]                                                       INTERVENTIONAL CARDIOLOGY CONSULTATION NOTE                                                                                             History obtained by: Patient and medical record  Community Cardiologist: none   Reason for Consultation: Evaluation for cardiac catheterization  Available pt records reviewed: Yes [ x ] No [  ]    Chief complaint:    Patient is a 60y old  Male who presents with a chief complaint of CAD (09 Jun 2025 15:58)      HPI:  59yo M PMHx HTN, HLD, DM2, pw CP and palpitation. Reports that he was power washing his boat the other days and developed some palpitaions as well as diaphoresis/dizziness. initially thought the symptoms were due to the heat. Developed persistent midsternal CP later that day whenevery he tried to walk around the house so he came to the ED for further eval. ROS negative for fever, chill, n/v/c/d nor urinary concerns. Pt was a former smoker.   ED vitals and labs negative. s/p TTE with preserved EF. Initially in obs, subsequently underwent CCTA which noted severe stenosis of LAD, and now requested by cards for admission for cath.    (09 Jun 2025 15:58)      Anginal Class:        Angina (Class): 3       Ischemic Symptoms: chest pain    Heart Failure: no        Systolic/Diastolic/Combined:        NYHA Class (within 2 weeks):       PAST MEDICAL HISTORY  HTN (hypertension)  HLD (hyperlipidemia)  DM (diabetes mellitus)        Associated Risk Factors:        Frailty Assessment: (none/mild/mod/severe):       Cerebrovascular Disease: N/A       Chronic Lung Disease: N/A       Peripheral Arterial Disease: N/A       Chronic Kidney Disease (if yes, what is GFR): N/A       Uncontrolled Diabetes (if yes, what is HgbA1C or FBS): N/A       Poorly Controlled Hypertension (if yes, what is SBP): N/A       Morbid Obesity (if yes, what is BMI): N/A       History of Recent Ventricular Arrhythmia: N/A       Inability to Ambulate Safely: N/A       Need for Therapeutic Anticoagulation: N/A       Antiplatelet or Contrast Allergy: N/A      PAST SURGICAL HISTORY  S/P appendectomy  SOCIAL HISTORY:   lives with spouse and 1 child,  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, social ETOH, denies cannabis or recreational drug use    FAMILY HISTORY:  No pertinent family history in first degree relatives      Family History of Premature Cardiovascular Disease:  Yes [  ] No [x  ]    HOME MEDICATIONS:  amLODIPine 5 mg oral tablet: 1 tab(s) orally once a day (09 Jun 2025 15:55)  Amphetamine-Dextroamphetamine: 15 milligram(s) orally once a day as needed for  anxiety (09 Jun 2025 15:55)  irbesartan 300 mg oral tablet: 1 tab(s) orally once a day (09 Jun 2025 15:55)  Janumet 50 mg-1000 mg oral tablet: 1 tab(s) orally 2 times a day (09 Jun 2025 15:55)  rosuvastatin 10 mg oral tablet: 1 tab(s) orally once a day (09 Jun 2025 15:55)  tadalafil 10 mg oral tablet: 1 tab(s) orally once a day (09 Jun 2025 15:55)      CURRENT CARDIAC MEDICATIONS:  amLODIPine   Tablet 5 milliGRAM(s) Oral daily  losartan 100 milliGRAM(s) Oral daily      Antianginal Therapies:        Beta Blockers:  no       Calcium Channel Blockers: yes        Long Acting Nitrates:        Ranexa:     ALLERGIES:   No Known Allergies      REVIEW OF SYMPTOMS:   CONSTITUTIONAL: no fever, no chills, no weight loss, no weight gain, no fatigue   CARDIOVASCULAR: no chest pain at present, no sob   RESPIRATORY: no Shortness of breath, no cough, no wheezing  : No dysuria, no hematuria   GI: No dark color stool, no nausea, no diarrhea, no constipation, no abdominal pain   NEURO: No headache, no slurred speech   ALL OTHER REVIEW OF SYSTEMS ARE NEGATIVE.    VITAL SIGNS:  T(C): 36.9 (06-10-25 @ 06:33), Max: 36.9 (06-10-25 @ 06:33)  T(F): 98.4 (06-10-25 @ 06:33), Max: 98.4 (06-10-25 @ 06:33)  HR: 81 (06-10-25 @ 06:33) (62 - 89)  BP: 157/83 (06-10-25 @ 06:33) (126/77 - 157/86)  RR: 18 (06-10-25 @ 06:33) (14 - 18)  SpO2: 97% (06-10-25 @ 06:33) (96% - 100%)       PHYSICAL EXAM:  Constitutional: Comfortable . No acute distress.   HEENT: Atraumatic and normocephalic , neck is supple . no JVD. No carotid bruit.  CNS: A&Ox3. No focal deficits.   Respiratory: CTAB, unlabored   Cardiovascular: RRR normal s1 s2. No murmur. No rubs or gallop.  Gastrointestinal: Soft, non-tender. +Bowel sounds.   Extremities: 2+ Peripheral Pulses, No clubbing, cyanosis, or edema  Psychiatric: Calm . no agitation.   Skin: Warm and dry, no ulcers on extremities     LABS:                            14.8   8.18  )-----------( 251      ( 10 Og 2025 03:11 )             42.9     06-10    141  |  105  |  15.1  ----------------------------<  209[H]  4.6   |  23.0  |  0.89    Ca    8.9      10 Og 2025 03:11    TPro  6.8  /  Alb  3.9  /  TBili  0.8  /  DBili  x   /  AST  13  /  ALT  11  /  AlkPhos  88  06-09    PT/INR - ( 08 Jun 2025 20:21 )   PT: 10.3 sec;   INR: 0.91 ratio         PTT - ( 08 Jun 2025 20:21 )  PTT:28.5 sec  Urinalysis Basic - ( 10 Og 2025 03:11 )    Color: x / Appearance: x / SG: x / pH: x  Gluc: 209 mg/dL / Ketone: x  / Bili: x / Urobili: x   Blood: x / Protein: x / Nitrite: x   Leuk Esterase: x / RBC: x / WBC x   Sq Epi: x / Non Sq Epi: x / Bacteria: x      ECG: < from: 12 Lead ECG (06.08.25 @ 18:46) >    Diagnosis Line Normal sinus rhythm 83 bpm  Nonspecific T wave abnormality  Abnormal ECG    < end of copied text >    Prior ECG: Yes [  ] No [ x ]    CARDIAC TESTING   ECHO:  < from: TTE W or WO Ultrasound Enhancing Agent (06.09.25 @ 08:05) >     CONCLUSIONS:      1. Left ventricular cavity is normal in size. Left ventricular wall thickness is normal. Left ventricular systolic function is normal with an ejection fraction visually estimated at 55 to 60 %. There are no regional wall motion abnormalities seen.   2. Basal septal left ventricular hypertrophy without LVOT obstruction.   3. Normal left ventricular diastolic function.   4. Normal right ventricular cavity size and normal right ventricular systolic function.   5. Left atrium is normal in size.   6. The right atrium is normal in size.   7. Trileaflet aortic valve. Fibrocalcific aortic valve sclerosis without stenosis.   8. Structurally normal mitral valve with normal leaflet excursion.   9. Trace mitral regurgitation.  10. No pericardial effusion seen.  11. Pulmonary artery systolic pressure could not be estimated.    < end of copied text >      STRESS:    CCTA:  < from: CT Angio Cardiac w/ IV Cont (06.09.25 @ 12:27) >    Impression:  1.  Obstructive coronary artery disease with mixed   (calcified/non-calcified) plaque resulting in severe stenosis (70-99%) of  the proximal LAD. Mild stenosis (25-49%) of the mid-distal RCA and   proximal LAD.  2.  Total Agatston calcium score is 195 AU.    < end of copied text >      CATH: n/a     ELECTROPHYSIOLOGY: n/a

## 2025-06-10 NOTE — DISCHARGE NOTE PROVIDER - PROVIDER TOKENS
PROVIDER:[TOKEN:[[id number]],SCHEDULEDAPPT:[06/13/2025],SCHEDULEDAPPTTIME:[01:00 PM]] PROVIDER:[TOKEN:[[id number]],SCHEDULEDAPPT:[06/13/2025],SCHEDULEDAPPTTIME:[01:00 PM]],PROVIDER:[TOKEN:[[id number]],FOLLOWUP:[1 month]]

## 2025-06-10 NOTE — DISCHARGE NOTE PROVIDER - NSDCFUSCHEDAPPT_GEN_ALL_CORE_FT
[address] Emeka Mantilla  [facility] Physician [facility]  CARDIOLOGY [address] [place]  Scheduled Appointment: 06/13/2025

## 2025-06-10 NOTE — PROGRESS NOTE ADULT - SUBJECTIVE AND OBJECTIVE BOX
[facility] PHYSICIAN [facility]NERS                                                         CARDIOLOGY AT [facility]                                                                  [address], [place] [address]                                                         Telephone: [phone]. Fax:[phone]                                                                             PROGRESS NOTE    Reason for follow up:  CAD  Update: Seen in Cath lab, Awaiting CCTA      Review of symptoms:   Cardiac:  No chest pain. No dyspnea. No palpitations.  Respiratory: no cough. No dyspnea  Gastrointestinal: No diarrhea. No abdominal pain. No bleeding.   Neuro: No focal neuro complaints.      Vitals:  T(C): 36.9 (06-10-25 @ 06:33), Max: 36.9 (06-10-25 @ 06:33)  HR: 81 (06-10-25 @ 06:33) (62 - 89)  BP: 157/83 (06-10-25 @ 06:33) (126/77 - 157/86)  RR: 18 (06-10-25 @ 06:33) (14 - 18)  SpO2: 97% (06-10-25 @ 06:33) (96% - 100%)  Wt(kg): --  I&O's Summary    Weight (kg): 81.9 (06-08 @ 18:45)      PHYSICAL EXAM:  Appearance: Comfortable. No acute distress  HEENT:  Atraumatic. Normocephalic.  Normal oral mucosa  Neurologic: A & O x 3, no gross focal deficits.  Cardiovascular: RRR S1 S2, No murmur, no rubs/gallops. No JVD  Respiratory: Lungs clear to auscultation, unlabored   Gastrointestinal:  Soft, Non-tender, + BS  Lower Extremities: No edema  Psychiatry: Patient is calm. No agitation.   Skin: warm and dry.      CURRENT MEDICATIONS:  amLODIPine   Tablet 5 milliGRAM(s) Oral daily  aspirin  chewable 81 milliGRAM(s) Oral daily  glucagon  Injectable 1 milliGRAM(s) IntraMuscular once  insulin lispro (ADMELOG) corrective regimen sliding scale   SubCutaneous three times a day before meals  losartan 100 milliGRAM(s) Oral daily  rosuvastatin 20 milliGRAM(s) Oral at bedtime  sodium chloride 0.9% Bolus 250 milliLiter(s) IV Bolus once    LABS:	 	  CARDIAC MARKERS ( 08 Jun 2025 20:21 )  x     / x     / x     / x     / 3.3 ng/mL  p-BNP 08 Jun 2025 20:21: x                              14.8   8.18  )-----------( 251      ( 10 Og 2025 03:11 )             42.9     06-10    141  |  105  |  15.1  ----------------------------<  209[H]  4.6   |  23.0  |  0.89    Ca    8.9      10 Og 2025 03:11    TPro  6.8  /  Alb  3.9  /  TBili  0.8  /  DBili  x   /  AST  13  /  ALT  11  /  AlkPhos  88  06-09    proBNP:   Lipid Profile:   HgA1c:   TSH:       TELEMETRY:   ECG:  	      DIAGNOSTIC TESTING:  [ ] Echocardiogram:   [ ]  Catheterization:  [ ] Stress Test:    OTHER: 	                                                                [facility]YSICIAN [facility]                                                         CARDIOLOGY AT [facility]                                                                  [address], [place] [address]                                                         Telephone: [phone]. Fax:[phone]                                                                             PROGRESS NOTE    Reason for follow up:  CAD  Update: Seen in Cath lab, Awaiting Cath for + CTA  Patient very nervous    Review of symptoms:   Cardiac:  No chest pain. No dyspnea. No palpitations.  Respiratory: no cough. No dyspnea  Gastrointestinal: No diarrhea. No abdominal pain. No bleeding.   Neuro: No focal neuro complaints.    Vitals:  T(C): 36.9 (06-10-25 @ 06:33), Max: 36.9 (06-10-25 @ 06:33)  HR: 81 (06-10-25 @ 06:33) (62 - 89)  BP: 157/83 (06-10-25 @ 06:33) (126/77 - 157/86)  RR: 18 (06-10-25 @ 06:33) (14 - 18)  SpO2: 97% (06-10-25 @ 06:33) (96% - 100%)  Wt(kg): --  I&O's Summary    Weight (kg): 81.9 (06-08 @ 18:45)    PHYSICAL EXAM:  Appearance: Comfortable. No acute distress  HEENT:  Atraumatic. Normocephalic.  Normal oral mucosa  Neurologic: A & O x 3, no gross focal deficits.  Cardiovascular: RRR S1 S2, No murmur, no rubs/gallops. No JVD  Respiratory: Lungs clear to auscultation, unlabored   Gastrointestinal:  Soft, Non-tender, + BS  Lower Extremities: No edema  Psychiatry: Patient is calm. No agitation.   Skin: warm and dry.    CURRENT MEDICATIONS:  amLODIPine   Tablet 5 milliGRAM(s) Oral daily  aspirin  chewable 81 milliGRAM(s) Oral daily  glucagon  Injectable 1 milliGRAM(s) IntraMuscular once  insulin lispro (ADMELOG) corrective regimen sliding scale   SubCutaneous three times a day before meals  losartan 100 milliGRAM(s) Oral daily  rosuvastatin 20 milliGRAM(s) Oral at bedtime  sodium chloride 0.9% Bolus 250 milliLiter(s) IV Bolus once    LABS:	 	  CARDIAC MARKERS ( 08 Jun 2025 20:21 )  x     / x     / x     / x     / 3.3 ng/mL  p-BNP 08 Jun 2025 20:21: x                              14.8   8.18  )-----------( 251      ( 10 Og 2025 03:11 )             42.9     06-10    141  |  105  |  15.1  ----------------------------<  209[H]  4.6   |  23.0  |  0.89    Ca    8.9      10 Og 2025 03:11    TPro  6.8  /  Alb  3.9  /  TBili  0.8  /  DBili  x   /  AST  13  /  ALT  11  /  AlkPhos  88  06-09    DIAGNOSTIC TESTING:  [ ] Echocardiogram: < from: TTE W or WO Ultrasound Enhancing Agent (06.09.25 @ 08:05) >   1. Left ventricular cavity is normal in size. Left ventricular wall thickness is normal. Left ventricular systolic function is normal with an ejection fraction visually estimated at 55 to 60 %. There are no regional wall motion abnormalities seen.   2. Basal septal left ventricular hypertrophy without LVOT obstruction.   3. Normal left ventricular diastolic function.   4. Normal right ventricular cavity size and normal right ventricular systolic function.   5. Left atrium is normal in size.   6. The right atrium is normal in size.   7. Trileaflet aortic valve. Fibrocalcific aortic valve sclerosis without stenosis.   8. Structurally normal mitral valve with normal leaflet excursion.   9. Trace mitral regurgitation.  10. No pericardial effusion seen.  11. Pulmonary artery systolic pressure could not be estimated.      < from: CT Angio Cardiac w/ IV Cont (06.09.25 @ 12:27) >  Left main (LM) coronary artery: 0  Left anterior descending (LAD) coronary artery:  91  Left circumflex (LCX) coronary artery: 0. Focal calcification in the   LCX/OM under the Agatston size and threshold  Right coronary artery (RCA):  103    Normal origin of the coronary arteries from their respective ostia. There   is a right dominant coronary arterial system. The left main coronary   artery bifurcates.  Left Main Artery (LM): Patent without evidence of significant plaque or   stenosis.  Left Anterior Descending Artery (LAD): Severe stenosis of the proximal   LAD due to mixed (calcified/non-calcified) plaque and mild stenosis of   the mid LAD due to calcified plaque. The distal LAD is patent without   evidence of significant plaque or stenosis.  Diagonal arteries are patent   without evidence of significant plaque or stenosis.  Left Circumflex Artery (LCX): Patent without evidence of significant   plaque or stenosis.  Minimal stenosisof the first obtuse marginal artery   due to a focal calcified plaque. The second obtuse marginal artery is   patent without evidence of significant plaque or stenosis.  Right Coronary Artery (RCA): Minimal stenosis of the proximal RCA due to   calcified plaque. Mild stenosis of the mid and distal RCA due to   predominantly calcified plaque. Minimal stenosis of the proximal segment   of the right posterior descending (PDA) due to predominantly   non-calcified plaque. The right posterolateral (RPL) artery are patent   without evidence of significant plaque or stenosis.

## 2025-06-10 NOTE — DISCHARGE NOTE PROVIDER - NSDCCPTREATMENT_GEN_ALL_CORE_FT
PRINCIPAL PROCEDURE  Procedure: Left heart cardiac cath  Findings and Treatment: Restricted use with no heavy lifting of affected arm for 48 hours.  No submerging the arm in water for 48 hours.  You may start showering today.  Call your doctor for any bleeding, swelling, loss of sensation in the hand or fingers, or fingers turning blue.  If heavy bleeding or large lumps form, hold pressure at the spot and come to the Emergency Room.

## 2025-06-11 ENCOUNTER — TRANSCRIPTION ENCOUNTER (OUTPATIENT)
Age: 60
End: 2025-06-11

## 2025-06-11 VITALS
RESPIRATION RATE: 20 BRPM | HEART RATE: 76 BPM | SYSTOLIC BLOOD PRESSURE: 158 MMHG | OXYGEN SATURATION: 96 % | DIASTOLIC BLOOD PRESSURE: 77 MMHG

## 2025-06-11 DIAGNOSIS — I25.10 ATHEROSCLEROTIC HEART DISEASE OF NATIVE CORONARY ARTERY WITHOUT ANGINA PECTORIS: ICD-10-CM

## 2025-06-11 LAB
ANION GAP SERPL CALC-SCNC: 11 MMOL/L — SIGNIFICANT CHANGE UP (ref 5–17)
BUN SERPL-MCNC: 19.8 MG/DL — SIGNIFICANT CHANGE UP (ref 8–20)
CALCIUM SERPL-MCNC: 8.5 MG/DL — SIGNIFICANT CHANGE UP (ref 8.4–10.5)
CHLORIDE SERPL-SCNC: 100 MMOL/L — SIGNIFICANT CHANGE UP (ref 96–108)
CHOLEST SERPL-MCNC: 123 MG/DL — SIGNIFICANT CHANGE UP
CO2 SERPL-SCNC: 24 MMOL/L — SIGNIFICANT CHANGE UP (ref 22–29)
CREAT SERPL-MCNC: 0.88 MG/DL — SIGNIFICANT CHANGE UP (ref 0.5–1.3)
EGFR: 99 ML/MIN/1.73M2 — SIGNIFICANT CHANGE UP
EGFR: 99 ML/MIN/1.73M2 — SIGNIFICANT CHANGE UP
GLUCOSE BLDC GLUCOMTR-MCNC: 199 MG/DL — HIGH (ref 70–99)
GLUCOSE SERPL-MCNC: 193 MG/DL — HIGH (ref 70–99)
HCT VFR BLD CALC: 39.9 % — SIGNIFICANT CHANGE UP (ref 39–50)
HDLC SERPL-MCNC: 43 MG/DL — SIGNIFICANT CHANGE UP
HGB BLD-MCNC: 13.3 G/DL — SIGNIFICANT CHANGE UP (ref 13–17)
LDLC SERPL-MCNC: 57 MG/DL — SIGNIFICANT CHANGE UP
LIPID PNL WITH DIRECT LDL SERPL: 57 MG/DL — SIGNIFICANT CHANGE UP
MAGNESIUM SERPL-MCNC: 1.6 MG/DL — SIGNIFICANT CHANGE UP (ref 1.6–2.6)
MCHC RBC-ENTMCNC: 28 PG — SIGNIFICANT CHANGE UP (ref 27–34)
MCHC RBC-ENTMCNC: 33.3 G/DL — SIGNIFICANT CHANGE UP (ref 32–36)
MCV RBC AUTO: 84 FL — SIGNIFICANT CHANGE UP (ref 80–100)
NONHDLC SERPL-MCNC: 80 MG/DL — SIGNIFICANT CHANGE UP
NRBC # BLD AUTO: 0 K/UL — SIGNIFICANT CHANGE UP (ref 0–0)
NRBC # FLD: 0 K/UL — SIGNIFICANT CHANGE UP (ref 0–0)
NRBC BLD AUTO-RTO: 0 /100 WBCS — SIGNIFICANT CHANGE UP (ref 0–0)
PLATELET # BLD AUTO: 215 K/UL — SIGNIFICANT CHANGE UP (ref 150–400)
PMV BLD: 10.2 FL — SIGNIFICANT CHANGE UP (ref 7–13)
POTASSIUM SERPL-MCNC: 4.1 MMOL/L — SIGNIFICANT CHANGE UP (ref 3.5–5.3)
POTASSIUM SERPL-SCNC: 4.1 MMOL/L — SIGNIFICANT CHANGE UP (ref 3.5–5.3)
RBC # BLD: 4.75 M/UL — SIGNIFICANT CHANGE UP (ref 4.2–5.8)
RBC # FLD: 12.3 % — SIGNIFICANT CHANGE UP (ref 10.3–14.5)
SODIUM SERPL-SCNC: 135 MMOL/L — SIGNIFICANT CHANGE UP (ref 135–145)
TRIGL SERPL-MCNC: 127 MG/DL — SIGNIFICANT CHANGE UP
WBC # BLD: 8.6 K/UL — SIGNIFICANT CHANGE UP (ref 3.8–10.5)
WBC # FLD AUTO: 8.6 K/UL — SIGNIFICANT CHANGE UP (ref 3.8–10.5)

## 2025-06-11 PROCEDURE — 83735 ASSAY OF MAGNESIUM: CPT

## 2025-06-11 PROCEDURE — C1874: CPT

## 2025-06-11 PROCEDURE — 85379 FIBRIN DEGRADATION QUANT: CPT

## 2025-06-11 PROCEDURE — 85027 COMPLETE CBC AUTOMATED: CPT

## 2025-06-11 PROCEDURE — 99285 EMERGENCY DEPT VISIT HI MDM: CPT

## 2025-06-11 PROCEDURE — C9600: CPT | Mod: LD

## 2025-06-11 PROCEDURE — 93010 ELECTROCARDIOGRAM REPORT: CPT

## 2025-06-11 PROCEDURE — 80048 BASIC METABOLIC PNL TOTAL CA: CPT

## 2025-06-11 PROCEDURE — 92978 ENDOLUMINL IVUS OCT C 1ST: CPT | Mod: LD

## 2025-06-11 PROCEDURE — 82550 ASSAY OF CK (CPK): CPT

## 2025-06-11 PROCEDURE — C1769: CPT

## 2025-06-11 PROCEDURE — C1725: CPT

## 2025-06-11 PROCEDURE — 84484 ASSAY OF TROPONIN QUANT: CPT

## 2025-06-11 PROCEDURE — 82962 GLUCOSE BLOOD TEST: CPT

## 2025-06-11 PROCEDURE — 93458 L HRT ARTERY/VENTRICLE ANGIO: CPT | Mod: 59

## 2025-06-11 PROCEDURE — C1894: CPT

## 2025-06-11 PROCEDURE — 85730 THROMBOPLASTIN TIME PARTIAL: CPT

## 2025-06-11 PROCEDURE — 36415 COLL VENOUS BLD VENIPUNCTURE: CPT

## 2025-06-11 PROCEDURE — 71045 X-RAY EXAM CHEST 1 VIEW: CPT

## 2025-06-11 PROCEDURE — 99233 SBSQ HOSP IP/OBS HIGH 50: CPT

## 2025-06-11 PROCEDURE — 83036 HEMOGLOBIN GLYCOSYLATED A1C: CPT

## 2025-06-11 PROCEDURE — 93005 ELECTROCARDIOGRAM TRACING: CPT

## 2025-06-11 PROCEDURE — 85610 PROTHROMBIN TIME: CPT

## 2025-06-11 PROCEDURE — 80053 COMPREHEN METABOLIC PANEL: CPT

## 2025-06-11 PROCEDURE — 75574 CT ANGIO HRT W/3D IMAGE: CPT

## 2025-06-11 PROCEDURE — 80061 LIPID PANEL: CPT

## 2025-06-11 PROCEDURE — 99239 HOSP IP/OBS DSCHRG MGMT >30: CPT

## 2025-06-11 PROCEDURE — C1753: CPT

## 2025-06-11 PROCEDURE — C1887: CPT

## 2025-06-11 PROCEDURE — G0378: CPT

## 2025-06-11 PROCEDURE — 82553 CREATINE MB FRACTION: CPT

## 2025-06-11 PROCEDURE — 85025 COMPLETE CBC W/AUTO DIFF WBC: CPT

## 2025-06-11 PROCEDURE — 93306 TTE W/DOPPLER COMPLETE: CPT

## 2025-06-11 RX ORDER — ASPIRIN 325 MG
1 TABLET ORAL
Qty: 30 | Refills: 2
Start: 2025-06-11 | End: 2025-09-08

## 2025-06-11 RX ORDER — METOPROLOL SUCCINATE 50 MG/1
1 TABLET, EXTENDED RELEASE ORAL
Qty: 30 | Refills: 2
Start: 2025-06-11 | End: 2025-09-08

## 2025-06-11 RX ORDER — CLOPIDOGREL BISULFATE 75 MG/1
1 TABLET, FILM COATED ORAL
Qty: 30 | Refills: 2
Start: 2025-06-11 | End: 2025-09-08

## 2025-06-11 RX ORDER — ROSUVASTATIN CALCIUM 20 MG/1
1 TABLET, FILM COATED ORAL
Refills: 0 | DISCHARGE

## 2025-06-11 RX ORDER — ROSUVASTATIN CALCIUM 20 MG/1
1 TABLET, FILM COATED ORAL
Qty: 30 | Refills: 2
Start: 2025-06-11 | End: 2025-09-08

## 2025-06-11 RX ADMIN — AMLODIPINE BESYLATE 5 MILLIGRAM(S): 10 TABLET ORAL at 05:47

## 2025-06-11 RX ADMIN — LOSARTAN POTASSIUM 100 MILLIGRAM(S): 100 TABLET, FILM COATED ORAL at 05:47

## 2025-06-11 RX ADMIN — Medication 81 MILLIGRAM(S): at 10:07

## 2025-06-11 RX ADMIN — METOPROLOL SUCCINATE 25 MILLIGRAM(S): 50 TABLET, EXTENDED RELEASE ORAL at 05:47

## 2025-06-11 RX ADMIN — CLOPIDOGREL BISULFATE 75 MILLIGRAM(S): 75 TABLET, FILM COATED ORAL at 10:07

## 2025-06-11 RX ADMIN — INSULIN LISPRO 2: 100 INJECTION, SOLUTION INTRAVENOUS; SUBCUTANEOUS at 10:07

## 2025-06-11 NOTE — DISCHARGE NOTE NURSING/CASE MANAGEMENT/SOCIAL WORK - FINANCIAL ASSISTANCE
[facility] provides services at a reduced cost to those who are determined to be eligible through [facility]’s financial assistance program. Information regarding [facility]’s financial assistance program can be found by going to [email or web address] or by calling [phone].

## 2025-06-11 NOTE — DISCHARGE NOTE NURSING/CASE MANAGEMENT/SOCIAL WORK - PATIENT PORTAL LINK FT
You can access the FollowMyHealth Patient Portal offered by [facility] by registering at the following website: [email or web address]. By joining [facility]’s FollowMyHealth portal, you will also be able to view your health information using other applications (apps) compatible with our system.

## 2025-06-11 NOTE — PROGRESS NOTE ADULT - NS ATTEND AMEND GEN_ALL_CORE FT
59yo M PMHx HTN, HLD, DM2, pw CP and palpitation. Reports that he was power washing his boat the other days and developed some palpitaions as well as diaphoresis/dizziness. initially thought the symptoms were due to the heat. Developed persistent midsternal CP later that day when every he tried to walk around the house so he came to the ED for further eval. ROS negative for fever, chill, n/v/c/d nor urinary concerns. Pt was a former smoker. ED vitals and labs negative. s/p TTE with preserved EF. Initially in obs, subsequently underwent CCTA which noted severe stenosis of LAD, and now requested by cards for admission for cath.   6-10 - Trops negative CTA Lad severe stenosis      repeat ekg no ischemia  DAPT uninterrupted patient in agreement.  lifestyle modifications  site check appropriate  telemetry appropriate  c/w GDMT  ambulated hallways without issues  Interventional cardiology on board.    f/u with Dr. Haynes as outpatient in 1 week.
61yo M PMHx HTN, HLD, DM2, pw CP and palpitation. Reports that he was power washing his boat the other days and developed some palpitaions as well as diaphoresis/dizziness. initially thought the symptoms were due to the heat. Developed persistent midsternal CP later that day when every he tried to walk around the house so he came to the ED for further eval. ROS negative for fever, chill, n/v/c/d nor urinary concerns. Pt was a former smoker. ED vitals and labs negative. s/p TTE with preserved EF. Initially in obs, subsequently underwent CCTA which noted severe stenosis of LAD, and now requested by cards for admission for cath.   6-10 - Trops negative CTA Lad severe stenosis      s/p PCI to prox LAD    site check tomorrow, discussed CAD cardiovascular disease, DAPT therapy, long term follow up education

## 2025-06-11 NOTE — PROGRESS NOTE ADULT - SUBJECTIVE AND OBJECTIVE BOX
[facility] PHYSICIAN PARTNERS                                                         CARDIOLOGY AT [facility]                                                                  [address], [id number] [place] [address]                                                         Telephone: [phone]. Fax:[phone]                                                                             PROGRESS NOTE    Reason for follow up:   CAd  Update:   S/P [facility] 6/10/25 -  80% prox LAD lesion treated with 4.0x22 MARY ANNE mary jo frontier, mid RCA 30%  Review of symptoms:   Cardiac:  No chest pain. No dyspnea. No palpitations.  Respiratory: no cough. No dyspnea  Gastrointestinal: No diarrhea. No abdominal pain. No bleeding.   Neuro: No focal neuro complaints.    Vitals:  T(C): 36.6 (06-11-25 @ 08:56), Max: 36.7 (06-10-25 @ 16:18)  HR: 71 (06-11-25 @ 08:56) (64 - 84)  BP: 165/90 (06-11-25 @ 08:56) (107/91 - 175/84)  RR: 18 (06-11-25 @ 08:56) (16 - 18)  SpO2: 97% (06-11-25 @ 08:56) (95% - 98%)  I&O's Summary    Weight (kg): 81.9 (06-08 @ 18:45)    PHYSICAL EXAM:  Appearance: Comfortable. No acute distress  HEENT:  Atraumatic. Normocephalic.  Normal oral mucosa  Neurologic: A & O x 3, no gross focal deficits.  Cardiovascular: RRR S1 S2, No murmur, no rubs/gallops. No JVD  Respiratory: Lungs clear to auscultation, unlabored   Gastrointestinal:  Soft, Non-tender, + BS  Lower Extremities: + Peripheral Pulses, No clubbing, cyanosis, or edema  Psychiatry: Patient is calm. No agitation.   Skin: warm and dry.  Right radial site:  No bleeding, no pain, no bruising, no hematoma + PP and no edema      CURRENT CARDIAC MEDICATIONS:  amLODIPine   Tablet 5 milliGRAM(s) Oral daily  losartan 100 milliGRAM(s) Oral daily  metoprolol succinate ER 25 milliGRAM(s) Oral daily    CURRENT OTHER MEDICATIONS:  amphetamine/dextroamphetamine XR 15 milliGRAM(s) Oral daily PRN anxiety  dextrose Oral Gel 15 Gram(s) Oral once, Stop order after: 1 Doses PRN Blood Glucose LESS THAN 70 milliGRAM(s)/deciliter  glucagon  Injectable 1 milliGRAM(s) IntraMuscular once, Stop order after: 1 Doses  insulin lispro (ADMELOG) corrective regimen sliding scale   SubCutaneous three times a day before meals  rosuvastatin 20 milliGRAM(s) Oral at bedtime  aspirin  chewable 81 milliGRAM(s) Oral daily  clopidogrel Tablet 75 milliGRAM(s) Oral daily    LABS:	 	                        13.3   8.60  )-----------( 215      ( 11 Jun 2025 04:33 )             39.9     06-11    135  |  100  |  19.8  ----------------------------<  193[H]  4.1   |  24.0  |  0.88    Ca    8.5      11 Jun 2025 04:33  Mg     1.6     06-11      PT/INR/PTT ( 08 Jun 2025 20:21 )                       :                       :      10.3         :       28.5                  .        .                   .              .           .       0.91        .                                       Lipid Profile: Date: 06-11 @ 04:33  Total cholesterol 123; Direct LDL: --; HDL: 43; Triglycerides:127    TELEMETRY:   SR, no acute alarms noted.

## 2025-06-11 NOTE — PROGRESS NOTE ADULT - ASSESSMENT
61yo M PMHx HTN, HLD, DM2, pw CP and palpitation. Noted with stenosis on CCTA, admitted for further care.     #CAD with concern for unstable angina   Trop flat, EKG without acute changes  sp  x1  TTE with preserved EF  CCTA reported noted with severe stenosis of LAD  sp [facility] s/p PCI 6/10  cards recs radhika    #HTN  #HLD  cw home statin, Losartan and Amlodipine     #DM  ISS, accu checks, CC  fu A1C level in AM    DVT ppx: SCD   Diet: CC  Dispo: in AM  
59yo M PMHx HTN, HLD, DM2, pw CP and palpitation. Reports that he was power washing his boat the other days and developed some palpitaions as well as diaphoresis/dizziness. initially thought the symptoms were due to the heat. Developed persistent midsternal CP later that day when every he tried to walk around the house so he came to the ED for further eval. ROS negative for fever, chill, n/v/c/d nor urinary concerns. Pt was a former smoker. ED vitals and labs negative. s/p TTE with preserved EF. Initially in obs, subsequently underwent CCTA which noted severe stenosis of LAD, and now requested by cards for admission for cath.   6-10 - Trops negative CTA Lad severe stenosis  
59yo M PMHx HTN, HLD, DM2, pw CP and palpitation. Reports that he was power washing his boat the other days and developed some palpitaions as well as diaphoresis/dizziness. initially thought the symptoms were due to the heat. Developed persistent midsternal CP later that day when every he tried to walk around the house so he came to the ED for further eval. ROS negative for fever, chill, n/v/c/d nor urinary concerns. Pt was a former smoker. ED vitals and labs negative. s/p TTE with preserved EF. Initially in obs, subsequently underwent CCTA which noted severe stenosis of LAD, and now requested by cards for admission for cath.   6-10 - Trops negative CTA Lad severe stenosis

## 2025-06-11 NOTE — CHART NOTE - NSCHARTNOTEFT_GEN_A_CORE
POD 1 LHC via RRA with Dr. Vinny Haynes revealing 80% prox LAD lesion treated with 4.0x22 MARY ANNE mary jo frontier, mid RCA 30%. Patient reports no current chest pain, no chest pressure, no shortness of breath, no palpitations, no dizziness. RRA site stable without active bleeding or wrist hematoma. RUE/ Right hand remains acyanotic; warm to touch; motor/sensory function intact; 2+ right radial pulse.    PLAN:  -Dual anti platelet therapy with aspirin/ plavix, reinforced importance of strict adherence to DAPT   -statin therapy crestor increased to 20mg  -continue ARB Irbesartan 300mg daily at discharge  -continue norvasc 5mg daily  -metoprolol succinate 25mg daily added by cardiology  -Hold metformin x 48 hours post cath. Ok to resume friday 6/13 AM  -Lifestyle modifications discussed to reduce cardiovascular risk factors including weight reduction, smoking cessation, medication compliance, and routine follow up with Cardiologist to track your BMI, cholesterol, and glucose levels.   -Post PCI follow up appt friday june 13th 1 pm  -patient to follow up with cardiologist dr charlie card in 2 weeks.   -further care per general cardiology team and medicine team

## 2025-06-11 NOTE — PROGRESS NOTE ADULT - PROBLEM SELECTOR PLAN 1
S/P [facility] 6/10/25 -  80% prox LAD lesion treated with 4.0x22 MARY ANNE mary jo frontier, mid RCA 30%  Right radial precautions reinforced  DAPT:  Asprin 81mg po dialy and Plavix 75mg po daily  Ct with ARB, BB and statin  LIfe style modifications:  Diet/exercise/cardiac rehab and medication compliance  Cardiology follow up one week post discharge.

## 2025-06-13 ENCOUNTER — NON-APPOINTMENT (OUTPATIENT)
Age: 60
End: 2025-06-13

## 2025-06-13 ENCOUNTER — APPOINTMENT (OUTPATIENT)
Dept: CARDIOLOGY | Facility: CLINIC | Age: 60
End: 2025-06-13
Payer: COMMERCIAL

## 2025-06-13 VITALS
HEIGHT: 69 IN | WEIGHT: 174 LBS | SYSTOLIC BLOOD PRESSURE: 134 MMHG | BODY MASS INDEX: 25.77 KG/M2 | HEART RATE: 75 BPM | OXYGEN SATURATION: 98 % | DIASTOLIC BLOOD PRESSURE: 75 MMHG

## 2025-06-13 PROBLEM — I10 ESSENTIAL (PRIMARY) HYPERTENSION: Chronic | Status: ACTIVE | Noted: 2025-06-09

## 2025-06-13 PROBLEM — E11.9 TYPE 2 DIABETES MELLITUS WITHOUT COMPLICATIONS: Chronic | Status: ACTIVE | Noted: 2025-06-09

## 2025-06-13 PROBLEM — E78.5 HYPERLIPIDEMIA, UNSPECIFIED: Chronic | Status: ACTIVE | Noted: 2025-06-09

## 2025-06-13 PROCEDURE — 99214 OFFICE O/P EST MOD 30 MIN: CPT | Mod: 25

## 2025-06-13 PROCEDURE — 93000 ELECTROCARDIOGRAM COMPLETE: CPT

## 2025-06-13 RX ORDER — IRBESARTAN 300 MG/1
300 TABLET ORAL
Refills: 0 | Status: ACTIVE | COMMUNITY
Start: 2025-06-13

## 2025-06-13 RX ORDER — AMLODIPINE BESYLATE 5 MG/1
5 TABLET ORAL
Refills: 0 | Status: ACTIVE | COMMUNITY
Start: 2025-06-13

## 2025-06-16 RX ORDER — TADALAFIL 20 MG/1
1 TABLET, FILM COATED ORAL
Refills: 0 | DISCHARGE

## 2025-06-16 RX ORDER — SITAGLIPTIN AND METFORMIN HYDROCHLORIDE 1000; 50 MG/1; MG/1
1 TABLET, FILM COATED ORAL
Refills: 0 | DISCHARGE

## 2025-06-16 RX ORDER — DEXTROAMPHETAMINE SACCHARATE, AMPHETAMINE ASPARTATE MONOHYDRATE, DEXTROAMPHETAMINE SULFATE AND AMPHETAMINE SULFATE 2.5; 2.5; 2.5; 2.5 MG/1; MG/1; MG/1; MG/1
15 CAPSULE, EXTENDED RELEASE ORAL
Refills: 0 | DISCHARGE

## 2025-06-16 RX ORDER — AMLODIPINE BESYLATE 10 MG/1
1 TABLET ORAL
Refills: 0 | DISCHARGE

## 2025-06-16 RX ORDER — IRBESARTAN 75 MG/1
1 TABLET ORAL
Refills: 0 | DISCHARGE

## 2025-06-16 NOTE — CHART NOTE - NSCHARTNOTEFT_GEN_A_CORE
Post-Discharge Medication Review: Completed	  	  Patient's preferred pharmacy was updated in [facility]: [phone]	  	  Caregiver (Berenice, wife) contacted to offer medication counseling post-discharge. Medication reconciliation completed. Per Caregiver, medications include:	  	  1.	amLODIPine 5 mg oral tablet 1 tab(s) orally once a day  2.	Amphetamine-Dextroamphetamine 15 milligram(s) orally once a day as needed for anxiety  3.	aspirin 81 mg oral tablet, chewable 1 tab(s) orally once a day  4.	clopidogrel 75 mg oral tablet 1 tab(s) orally once a day  5.	Crestor 20 mg oral tablet 1 tab(s) orally once a day (at bedtime)  6.	irbesartan 300 mg oral tablet 1 tab(s) orally once a day  7.	Janumet 50 mg-1000 mg oral tablet 1 tab(s) orally 2 times a day  8.	metoprolol succinate 25 mg oral tablet, extended release 1 tab(s) orally once a day  9.	tadalafil 10 mg oral tablet 1 tab(s) orally once a day  	  Medication name, indication, administration, side effects, and monitoring reviewed for new medications during post discharge counseling visit with Caregiver. Caregiver demonstrated understanding. Counseling offered for all medications.	  	    	  Rolando Willis, [place]	  Clinical Pharmacy Specialist, Pharmacy Telehealth Team	  Can be reached via MS Teams or [phone]

## 2025-06-19 RX ORDER — ASPIRIN 81 MG
81 TABLET, DELAYED RELEASE (ENTERIC COATED) ORAL DAILY
Refills: 0 | Status: ACTIVE | COMMUNITY

## 2025-06-19 RX ORDER — CLOPIDOGREL 75 MG/1
75 TABLET, FILM COATED ORAL DAILY
Refills: 0 | Status: ACTIVE | COMMUNITY

## 2025-06-19 RX ORDER — METOPROLOL SUCCINATE 25 MG/1
25 TABLET, EXTENDED RELEASE ORAL DAILY
Refills: 0 | Status: ACTIVE | COMMUNITY

## 2025-06-19 RX ORDER — ROSUVASTATIN CALCIUM 10 MG/1
10 TABLET, FILM COATED ORAL DAILY
Refills: 0 | Status: ACTIVE | COMMUNITY

## 2025-07-18 ENCOUNTER — NON-APPOINTMENT (OUTPATIENT)
Age: 60
End: 2025-07-18

## 2025-07-18 ENCOUNTER — RX ONLY (RX ONLY)
Age: 60
End: 2025-07-18

## 2025-07-18 ENCOUNTER — APPOINTMENT (OUTPATIENT)
Dept: CARDIOLOGY | Facility: CLINIC | Age: 60
End: 2025-07-18

## 2025-07-18 ENCOUNTER — OFFICE (OUTPATIENT)
Dept: URBAN - METROPOLITAN AREA CLINIC 63 | Facility: CLINIC | Age: 60
Setting detail: OPHTHALMOLOGY
End: 2025-07-18
Payer: COMMERCIAL

## 2025-07-18 VITALS
OXYGEN SATURATION: 99 % | HEART RATE: 83 BPM | DIASTOLIC BLOOD PRESSURE: 72 MMHG | SYSTOLIC BLOOD PRESSURE: 156 MMHG | BODY MASS INDEX: 27.11 KG/M2 | WEIGHT: 183 LBS | HEIGHT: 69 IN

## 2025-07-18 VITALS — SYSTOLIC BLOOD PRESSURE: 146 MMHG | DIASTOLIC BLOOD PRESSURE: 70 MMHG

## 2025-07-18 VITALS — BODY MASS INDEX: 26.58 KG/M2 | WEIGHT: 180 LBS

## 2025-07-18 DIAGNOSIS — H16.223: ICD-10-CM

## 2025-07-18 DIAGNOSIS — H25.813: ICD-10-CM

## 2025-07-18 PROBLEM — H11.153 PINGUECULA; BOTH EYES: Status: ACTIVE | Noted: 2025-07-18

## 2025-07-18 PROBLEM — H10.45 ALLERGIC CONJUNCTIVITIS: Status: ACTIVE | Noted: 2025-07-18

## 2025-07-18 PROCEDURE — 99214 OFFICE O/P EST MOD 30 MIN: CPT | Mod: 25

## 2025-07-18 PROCEDURE — 93000 ELECTROCARDIOGRAM COMPLETE: CPT

## 2025-07-18 PROCEDURE — 92002 INTRM OPH EXAM NEW PATIENT: CPT | Performed by: INTERNAL MEDICINE

## 2025-07-18 ASSESSMENT — TONOMETRY
OS_IOP_MMHG: 15
OD_IOP_MMHG: 15

## 2025-07-18 ASSESSMENT — SUPERFICIAL PUNCTATE KERATITIS (SPK)
OD_SPK: 1+
OS_SPK: 1+

## 2025-07-18 ASSESSMENT — VISUAL ACUITY
OD_BCVA: 20/25
OS_BCVA: 20/25

## 2025-07-18 ASSESSMENT — CONFRONTATIONAL VISUAL FIELD TEST (CVF)
OD_FINDINGS: FULL
OS_FINDINGS: FULL

## 2025-07-18 ASSESSMENT — REFRACTION_AUTOREFRACTION
OD_AXIS: 075
OS_SPHERE: +1.00
OD_CYLINDER: -2.25
OS_AXIS: 091
OS_CYLINDER: -2.25
OD_SPHERE: +1.75

## 2025-07-18 ASSESSMENT — KERATOMETRY
OD_K1POWER_DIOPTERS: 43.50
OS_K2POWER_DIOPTERS: 45.25
OS_K1POWER_DIOPTERS: 44.00
OD_K2POWER_DIOPTERS: 45.00
OD_AXISANGLE_DEGREES: 171
OS_AXISANGLE_DEGREES: 178